# Patient Record
Sex: MALE | Race: WHITE | NOT HISPANIC OR LATINO | Employment: FULL TIME | URBAN - METROPOLITAN AREA
[De-identification: names, ages, dates, MRNs, and addresses within clinical notes are randomized per-mention and may not be internally consistent; named-entity substitution may affect disease eponyms.]

---

## 2017-06-10 ENCOUNTER — TRANSFERRED RECORDS (OUTPATIENT)
Dept: HEALTH INFORMATION MANAGEMENT | Facility: CLINIC | Age: 32
End: 2017-06-10

## 2017-06-14 ENCOUNTER — TRANSFERRED RECORDS (OUTPATIENT)
Dept: HEALTH INFORMATION MANAGEMENT | Facility: CLINIC | Age: 32
End: 2017-06-14

## 2017-06-15 ENCOUNTER — TRANSFERRED RECORDS (OUTPATIENT)
Dept: HEALTH INFORMATION MANAGEMENT | Facility: CLINIC | Age: 32
End: 2017-06-15

## 2017-12-25 ENCOUNTER — TRANSFERRED RECORDS (OUTPATIENT)
Dept: HEALTH INFORMATION MANAGEMENT | Facility: CLINIC | Age: 32
End: 2017-12-25

## 2018-01-08 ENCOUNTER — TRANSFERRED RECORDS (OUTPATIENT)
Dept: HEALTH INFORMATION MANAGEMENT | Facility: CLINIC | Age: 33
End: 2018-01-08

## 2018-01-13 ENCOUNTER — TRANSFERRED RECORDS (OUTPATIENT)
Dept: HEALTH INFORMATION MANAGEMENT | Facility: CLINIC | Age: 33
End: 2018-01-13

## 2022-02-13 ENCOUNTER — HOSPITAL ENCOUNTER (OUTPATIENT)
Facility: CLINIC | Age: 37
Setting detail: OBSERVATION
Discharge: HOME | End: 2022-02-15
Attending: EMERGENCY MEDICINE | Admitting: INTERNAL MEDICINE

## 2022-02-13 ENCOUNTER — APPOINTMENT (OUTPATIENT)
Dept: GENERAL RADIOLOGY | Facility: CLINIC | Age: 37
End: 2022-02-13
Attending: EMERGENCY MEDICINE
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
## 2022-02-13 DIAGNOSIS — L03.119 CELLULITIS OF HAND: ICD-10-CM

## 2022-02-13 LAB
ANION GAP SERPL CALCULATED.3IONS-SCNC: 6 MMOL/L (ref 3–14)
BASOPHILS # BLD AUTO: 0 10E3/UL (ref 0–0.2)
BASOPHILS NFR BLD AUTO: 0 %
BUN SERPL-MCNC: 12 MG/DL (ref 7–30)
CALCIUM SERPL-MCNC: 9.8 MG/DL (ref 8.5–10.1)
CHLORIDE BLD-SCNC: 106 MMOL/L (ref 94–109)
CO2 SERPL-SCNC: 27 MMOL/L (ref 20–32)
CREAT SERPL-MCNC: 0.78 MG/DL (ref 0.66–1.25)
EOSINOPHIL # BLD AUTO: 0.5 10E3/UL (ref 0–0.7)
EOSINOPHIL NFR BLD AUTO: 5 %
ERYTHROCYTE [DISTWIDTH] IN BLOOD BY AUTOMATED COUNT: 11.7 % (ref 10–15)
GFR SERPL CREATININE-BSD FRML MDRD: >90 ML/MIN/1.73M2
GLUCOSE BLD-MCNC: 84 MG/DL (ref 70–99)
HCT VFR BLD AUTO: 43.4 % (ref 40–53)
HGB BLD-MCNC: 14.4 G/DL (ref 13.3–17.7)
HOLD SPECIMEN: NORMAL
HOLD SPECIMEN: NORMAL
IMM GRANULOCYTES # BLD: 0 10E3/UL
IMM GRANULOCYTES NFR BLD: 0 %
LYMPHOCYTES # BLD AUTO: 1.5 10E3/UL (ref 0.8–5.3)
LYMPHOCYTES NFR BLD AUTO: 15 %
MCH RBC QN AUTO: 29.8 PG (ref 26.5–33)
MCHC RBC AUTO-ENTMCNC: 33.2 G/DL (ref 31.5–36.5)
MCV RBC AUTO: 90 FL (ref 78–100)
MONOCYTES # BLD AUTO: 0.9 10E3/UL (ref 0–1.3)
MONOCYTES NFR BLD AUTO: 9 %
NEUTROPHILS # BLD AUTO: 7 10E3/UL (ref 1.6–8.3)
NEUTROPHILS NFR BLD AUTO: 71 %
NRBC # BLD AUTO: 0 10E3/UL
NRBC BLD AUTO-RTO: 0 /100
PLATELET # BLD AUTO: 260 10E3/UL (ref 150–450)
POTASSIUM BLD-SCNC: 3.7 MMOL/L (ref 3.4–5.3)
RBC # BLD AUTO: 4.83 10E6/UL (ref 4.4–5.9)
SARS-COV-2 RNA RESP QL NAA+PROBE: NEGATIVE
SODIUM SERPL-SCNC: 139 MMOL/L (ref 133–144)
WBC # BLD AUTO: 9.9 10E3/UL (ref 4–11)

## 2022-02-13 PROCEDURE — 96375 TX/PRO/DX INJ NEW DRUG ADDON: CPT

## 2022-02-13 PROCEDURE — 36415 COLL VENOUS BLD VENIPUNCTURE: CPT | Performed by: EMERGENCY MEDICINE

## 2022-02-13 PROCEDURE — 99285 EMERGENCY DEPT VISIT HI MDM: CPT | Mod: 25

## 2022-02-13 PROCEDURE — 250N000011 HC RX IP 250 OP 636: Performed by: EMERGENCY MEDICINE

## 2022-02-13 PROCEDURE — 73130 X-RAY EXAM OF HAND: CPT | Mod: RT

## 2022-02-13 PROCEDURE — 87635 SARS-COV-2 COVID-19 AMP PRB: CPT | Performed by: EMERGENCY MEDICINE

## 2022-02-13 PROCEDURE — 85025 COMPLETE CBC W/AUTO DIFF WBC: CPT | Performed by: EMERGENCY MEDICINE

## 2022-02-13 PROCEDURE — 96365 THER/PROPH/DIAG IV INF INIT: CPT

## 2022-02-13 PROCEDURE — G0378 HOSPITAL OBSERVATION PER HR: HCPCS

## 2022-02-13 PROCEDURE — 250N000013 HC RX MED GY IP 250 OP 250 PS 637: Performed by: PHYSICIAN ASSISTANT

## 2022-02-13 PROCEDURE — 80048 BASIC METABOLIC PNL TOTAL CA: CPT | Performed by: EMERGENCY MEDICINE

## 2022-02-13 PROCEDURE — C9803 HOPD COVID-19 SPEC COLLECT: HCPCS

## 2022-02-13 PROCEDURE — 99220 PR INITIAL OBSERVATION CARE,LEVEL III: CPT | Performed by: PHYSICIAN ASSISTANT

## 2022-02-13 RX ORDER — IBUPROFEN 200 MG
400 TABLET ORAL EVERY 4 HOURS PRN
COMMUNITY
End: 2023-04-21

## 2022-02-13 RX ORDER — ONDANSETRON 4 MG/1
4 TABLET, ORALLY DISINTEGRATING ORAL EVERY 6 HOURS PRN
Status: DISCONTINUED | OUTPATIENT
Start: 2022-02-13 | End: 2022-02-15 | Stop reason: HOSPADM

## 2022-02-13 RX ORDER — LIDOCAINE 40 MG/G
CREAM TOPICAL
Status: DISCONTINUED | OUTPATIENT
Start: 2022-02-13 | End: 2022-02-15 | Stop reason: HOSPADM

## 2022-02-13 RX ORDER — ACETAMINOPHEN 325 MG/1
325-650 TABLET ORAL EVERY 4 HOURS PRN
COMMUNITY
End: 2023-04-21

## 2022-02-13 RX ORDER — AMPICILLIN AND SULBACTAM 2; 1 G/1; G/1
3 INJECTION, POWDER, FOR SOLUTION INTRAMUSCULAR; INTRAVENOUS EVERY 6 HOURS
Status: DISCONTINUED | OUTPATIENT
Start: 2022-02-13 | End: 2022-02-13

## 2022-02-13 RX ORDER — AMOXICILLIN 250 MG
1 CAPSULE ORAL 2 TIMES DAILY PRN
Status: DISCONTINUED | OUTPATIENT
Start: 2022-02-13 | End: 2022-02-15 | Stop reason: HOSPADM

## 2022-02-13 RX ORDER — ONDANSETRON 2 MG/ML
4 INJECTION INTRAMUSCULAR; INTRAVENOUS EVERY 6 HOURS PRN
Status: DISCONTINUED | OUTPATIENT
Start: 2022-02-13 | End: 2022-02-15 | Stop reason: HOSPADM

## 2022-02-13 RX ORDER — IBUPROFEN 600 MG/1
600 TABLET, FILM COATED ORAL EVERY 6 HOURS PRN
Status: DISCONTINUED | OUTPATIENT
Start: 2022-02-14 | End: 2022-02-15 | Stop reason: HOSPADM

## 2022-02-13 RX ORDER — AMOXICILLIN 250 MG
2 CAPSULE ORAL 2 TIMES DAILY PRN
Status: DISCONTINUED | OUTPATIENT
Start: 2022-02-13 | End: 2022-02-15 | Stop reason: HOSPADM

## 2022-02-13 RX ORDER — AMPICILLIN AND SULBACTAM 2; 1 G/1; G/1
3 INJECTION, POWDER, FOR SOLUTION INTRAMUSCULAR; INTRAVENOUS EVERY 6 HOURS
Status: DISCONTINUED | OUTPATIENT
Start: 2022-02-14 | End: 2022-02-15 | Stop reason: HOSPADM

## 2022-02-13 RX ORDER — KETOROLAC TROMETHAMINE 15 MG/ML
15 INJECTION, SOLUTION INTRAMUSCULAR; INTRAVENOUS ONCE
Status: COMPLETED | OUTPATIENT
Start: 2022-02-13 | End: 2022-02-13

## 2022-02-13 RX ORDER — ACETAMINOPHEN 325 MG/1
975 TABLET ORAL EVERY 8 HOURS
Status: DISCONTINUED | OUTPATIENT
Start: 2022-02-13 | End: 2022-02-15 | Stop reason: HOSPADM

## 2022-02-13 RX ADMIN — KETOROLAC TROMETHAMINE 15 MG: 15 INJECTION, SOLUTION INTRAMUSCULAR; INTRAVENOUS at 19:19

## 2022-02-13 RX ADMIN — AMPICILLIN SODIUM AND SULBACTAM SODIUM 3 G: 2; 1 INJECTION, POWDER, FOR SOLUTION INTRAMUSCULAR; INTRAVENOUS at 19:02

## 2022-02-13 RX ADMIN — ACETAMINOPHEN 975 MG: 325 TABLET, FILM COATED ORAL at 22:21

## 2022-02-13 ASSESSMENT — MIFFLIN-ST. JEOR: SCORE: 1910.58

## 2022-02-13 NOTE — ED TRIAGE NOTES
Pt was bit by a cat bite on Friday. Cat is a household pet. Pt received IV abx at [facility] and was started on oral antibiotics. Pt reports that pain and swelling is getting worse. ABC intact.

## 2022-02-14 LAB
ANION GAP SERPL CALCULATED.3IONS-SCNC: 4 MMOL/L (ref 3–14)
BUN SERPL-MCNC: 12 MG/DL (ref 7–30)
CALCIUM SERPL-MCNC: 8.9 MG/DL (ref 8.5–10.1)
CHLORIDE BLD-SCNC: 107 MMOL/L (ref 94–109)
CO2 SERPL-SCNC: 28 MMOL/L (ref 20–32)
CREAT SERPL-MCNC: 0.74 MG/DL (ref 0.66–1.25)
CRP SERPL-MCNC: 20.5 MG/L (ref 0–8)
ERYTHROCYTE [DISTWIDTH] IN BLOOD BY AUTOMATED COUNT: 11.9 % (ref 10–15)
GFR SERPL CREATININE-BSD FRML MDRD: >90 ML/MIN/1.73M2
GLUCOSE BLD-MCNC: 88 MG/DL (ref 70–99)
HCT VFR BLD AUTO: 43.9 % (ref 40–53)
HGB BLD-MCNC: 14.5 G/DL (ref 13.3–17.7)
MCH RBC QN AUTO: 29.7 PG (ref 26.5–33)
MCHC RBC AUTO-ENTMCNC: 33 G/DL (ref 31.5–36.5)
MCV RBC AUTO: 90 FL (ref 78–100)
PLATELET # BLD AUTO: 264 10E3/UL (ref 150–450)
POTASSIUM BLD-SCNC: 3.3 MMOL/L (ref 3.4–5.3)
RBC # BLD AUTO: 4.88 10E6/UL (ref 4.4–5.9)
SODIUM SERPL-SCNC: 139 MMOL/L (ref 133–144)
WBC # BLD AUTO: 9.2 10E3/UL (ref 4–11)

## 2022-02-14 PROCEDURE — 85027 COMPLETE CBC AUTOMATED: CPT | Performed by: PHYSICIAN ASSISTANT

## 2022-02-14 PROCEDURE — 36415 COLL VENOUS BLD VENIPUNCTURE: CPT | Performed by: PHYSICIAN ASSISTANT

## 2022-02-14 PROCEDURE — G0378 HOSPITAL OBSERVATION PER HR: HCPCS

## 2022-02-14 PROCEDURE — 86140 C-REACTIVE PROTEIN: CPT | Performed by: PHYSICIAN ASSISTANT

## 2022-02-14 PROCEDURE — 99207 PR NON-BILLABLE SERV PER CHARTING: CPT | Performed by: PHYSICIAN ASSISTANT

## 2022-02-14 PROCEDURE — 96376 TX/PRO/DX INJ SAME DRUG ADON: CPT

## 2022-02-14 PROCEDURE — 82310 ASSAY OF CALCIUM: CPT | Performed by: PHYSICIAN ASSISTANT

## 2022-02-14 PROCEDURE — 250N000013 HC RX MED GY IP 250 OP 250 PS 637: Performed by: PHYSICIAN ASSISTANT

## 2022-02-14 PROCEDURE — 250N000011 HC RX IP 250 OP 636: Performed by: PHYSICIAN ASSISTANT

## 2022-02-14 RX ORDER — NALOXONE HYDROCHLORIDE 0.4 MG/ML
0.4 INJECTION, SOLUTION INTRAMUSCULAR; INTRAVENOUS; SUBCUTANEOUS
Status: DISCONTINUED | OUTPATIENT
Start: 2022-02-14 | End: 2022-02-15 | Stop reason: HOSPADM

## 2022-02-14 RX ORDER — NALOXONE HYDROCHLORIDE 0.4 MG/ML
0.2 INJECTION, SOLUTION INTRAMUSCULAR; INTRAVENOUS; SUBCUTANEOUS
Status: DISCONTINUED | OUTPATIENT
Start: 2022-02-14 | End: 2022-02-15 | Stop reason: HOSPADM

## 2022-02-14 RX ORDER — KETOROLAC TROMETHAMINE 15 MG/ML
15 INJECTION, SOLUTION INTRAMUSCULAR; INTRAVENOUS EVERY 6 HOURS PRN
Status: DISCONTINUED | OUTPATIENT
Start: 2022-02-14 | End: 2022-02-15 | Stop reason: HOSPADM

## 2022-02-14 RX ORDER — OXYCODONE HYDROCHLORIDE 5 MG/1
5 TABLET ORAL EVERY 4 HOURS PRN
Status: DISCONTINUED | OUTPATIENT
Start: 2022-02-14 | End: 2022-02-15 | Stop reason: HOSPADM

## 2022-02-14 RX ADMIN — ACETAMINOPHEN 975 MG: 325 TABLET, FILM COATED ORAL at 22:05

## 2022-02-14 RX ADMIN — IBUPROFEN 600 MG: 600 TABLET ORAL at 01:19

## 2022-02-14 RX ADMIN — AMPICILLIN SODIUM AND SULBACTAM SODIUM 3 G: 2; 1 INJECTION, POWDER, FOR SOLUTION INTRAMUSCULAR; INTRAVENOUS at 20:39

## 2022-02-14 RX ADMIN — IBUPROFEN 600 MG: 600 TABLET ORAL at 17:28

## 2022-02-14 RX ADMIN — ACETAMINOPHEN 975 MG: 325 TABLET, FILM COATED ORAL at 13:53

## 2022-02-14 RX ADMIN — AMPICILLIN SODIUM AND SULBACTAM SODIUM 3 G: 2; 1 INJECTION, POWDER, FOR SOLUTION INTRAMUSCULAR; INTRAVENOUS at 01:20

## 2022-02-14 RX ADMIN — AMPICILLIN SODIUM AND SULBACTAM SODIUM 3 G: 2; 1 INJECTION, POWDER, FOR SOLUTION INTRAMUSCULAR; INTRAVENOUS at 13:53

## 2022-02-14 RX ADMIN — ACETAMINOPHEN 975 MG: 325 TABLET, FILM COATED ORAL at 06:18

## 2022-02-14 RX ADMIN — AMPICILLIN SODIUM AND SULBACTAM SODIUM 3 G: 2; 1 INJECTION, POWDER, FOR SOLUTION INTRAMUSCULAR; INTRAVENOUS at 06:18

## 2022-02-14 RX ADMIN — IBUPROFEN 600 MG: 600 TABLET ORAL at 10:32

## 2022-02-14 NOTE — H&P
History and Physical     Brent Brock MRN# [id number]   YOB: 1985 Age: 37 year old      Date of Admission:  2/13/2022    Primary care provider: No Ref-Primary, Physician          Assessment and Plan:   Brent Brock is a 37 year old male with a PMH significant for diverticulitis with partial colon removal and abscess drainage who presents with right hand swelling, redness and pain after being bitten multiple times by his mother-in-law's vaccinated cat.    Patient was discussed with Dr. Lr, who was provider in ED. Chart review of ED work up was reviewed as well as chart review of Care Everywhere, previous visits and admissions.     #Right hand cat bite cellulitis  #Right hand swelling, erythema and pain  -Multiple cat bites by resident vaccinated on dorsal part of right hand on 2/11  -Evaluated in urgent care on 2/12 with tetanus vaccination, IV clindamycin and initiation of Augmentin  -Took Augmentin on 2/12 and this morning but swelling and pain has worsened  -He is afebrile with normal WBC and hand x-ray did not show evidence of fracture or free air  -He was started on IV Unasyn and given Toradol  -Dorsal portion of hand and fingers appears swollen but there is no deep erythema or spreading cellulitis, there is a light generalized redness of the dorsal portion of the hand  -Unclear if this will need surgical washout but I doubt it  -Elevate arm  -Ice right hand  -Continue Unasyn  -We will alternate Tylenol with ibuprofen and have oxycodone/Dilaudid available  -Will be n.p.o. after midnight in case surgical washout is needed  -Orthopedic consult      Social: No concerns  Code: Discussed with patient and they have chosen full code  VTE prophylaxis: PCDs  Disposition: Observation                    Chief Complaint:   Right hand swelling, redness and pain after multiple cat bites         History of Present Illness:   Brent Brock is a 37 year old male who presents with  multiple cat bite wounds on his right hand.  He reports his cat and his mother-in-law's cat got into a fight on 2/11.  He broke up the fight but was bitten multiple times by his mother-in-law's cat.  He believes the cat is vaccinated and has not been exhibiting any odd behavior.  He went to urgent care and received tetanus vaccine, IV clindamycin and a prescription for Augmentin.  He took 2 doses on 2/12 and 1 dose this morning but has had increased swelling and pain of the right hand.  He also notes that his wife gave birth to their first child on 2/11 and he is not only sleep exhausted but has been assisting with the care of the baby.  He has been taking ibuprofen and Tylenol for pain.  He has not been ill recently and denies fever, chills, nausea, vomiting, diarrhea and abdominal pain.  He does not drink alcohol daily or smoke cigarettes bud does chew tobacco.             Past Medical History:   Diverticulitis          Past Surgical History:     Past Surgical History:   Procedure Laterality Date     BIOPSY OF EYELID      Benign growth     Diverticular abscess drainage and partial colon removal          Social History:     Social History     Socioeconomic History     Marital status:      Spouse name: Not on file     Number of children: Not on file     Years of education: Not on file     Highest education level: Not on file   Occupational History     Not on file   Tobacco Use     Smoking status: Current Every Day Smoker     Smokeless tobacco: Not on file     Tobacco comment: 1 ppd   Substance and Sexual Activity     Alcohol use: No     Drug use: No     Sexual activity: Yes     Partners: Female     Birth control/protection: Condom   Other Topics Concern     Not on file   Social History Narrative     Not on file     Social Determinants of Health     Financial Resource Strain: Not on file   Food Insecurity: Not on file   Transportation Needs: Not on file   Physical Activity: Not on file   Stress: Not on file    Social Connections: Not on file   Intimate Partner Violence: Not on file   Housing Stability: Not on file               Family History:     Family History   Problem Relation Age of Onset     Hypertension Mother      Depression Sister      Diabetes Mother         borderline              Allergies:      Allergies   Allergen Reactions     No Known Drug Allergies                Medications:     Prior to Admission medications    Medication Sig Last Dose Taking? Auth Provider   acetaminophen (TYLENOL) 325 MG tablet Take 325-650 mg by mouth every 4 hours as needed for mild pain 2/13/2022 at 1300 Yes Unknown, Entered By History   amoxicillin-clavulanate (AUGMENTIN) 875-125 MG tablet Take 1 tablet by mouth 2 times daily For 10 days 2/13/2022 at 1100 Yes Unknown, Entered By History   ibuprofen (ADVIL/MOTRIN) 200 MG tablet Take 400 mg by mouth every 4 hours as needed for mild pain or inflammatory pain 2/13/2022 at 0900 Yes Unknown, Entered By History              Review of Systems:   A Comprehensive greater than 10 system review of systems was carried out.  Pertinent positives and negatives are noted above.  Otherwise negative for contributory information.            Physical Exam:   Blood pressure (!) 153/93, pulse 94, temperature 98.5  F (36.9  C), temperature source Oral, resp. rate 16, SpO2 100 %.  Exam:  GENERAL:  Comfortable.  PSYCH: pleasant, oriented, No acute distress.  HEENT:  PERRLA. Normal conjunctiva, normal hearing, nasal mucosa and Oropharynx are normal.  NECK:  Supple, no neck vein distention, adenopathy or bruits, normal thyroid.  HEART:  Normal S1, S2 with no murmur, no pericardial rub, gallops or S3 or S4.  LUNGS:  Clear to auscultation, normal Respiratory effort. No wheezing, rales or ronchi.  ABDOMEN:  Soft, no hepatosplenomegaly, normal bowel sounds. Non-tender, non distended.   EXTREMITIES:  No pedal edema, +2 pulses bilateral and equal.  Right hand is diffusely swollen with some slight erythrema on  the dorsal surface extending into the fingers partially.  There are multiple cat bites on his hand that are not surrounded by any dark erythema.  He is unable to bend any of his fingers very much due to swelling and most of the pain is centered in his middle finger.  SKIN:  Dry to touch, No rash, wound or ulcerations.  NEUROLOGIC:  CN 2-12 grossly intact,sensation is intact with no focal deficits.               Data:     Recent Labs   Lab 02/13/22  1820   WBC 9.9   HGB 14.4   HCT 43.4   MCV 90        Recent Labs   Lab 02/13/22  1820      POTASSIUM 3.7   CHLORIDE 106   CO2 27   ANIONGAP 6   GLC 84   BUN 12   CR 0.78   GFRESTIMATED >90   AILYN 9.8         Recent Results (from the past 24 hour(s))   XR Hand Right G/E 3 Views    Narrative    EXAM: XR HAND RT G/E 3 VW  LOCATION: [facility]  DATE/TIME: 2/13/2022 7:10 PM    INDICATION: pain swelling after cat bite  COMPARISON: None.      Impression    IMPRESSION: Soft tissue swelling the dorsum of the metacarpal region. No cortical erosion to suggest osteomyelitis. No fractures are evident. Normal joint spacing and alignment.         Mayi Linn PA-C

## 2022-02-14 NOTE — PLAN OF CARE
PRIMARY DIAGNOSIS: R. Hand cellulitis  OUTPATIENT/OBSERVATION GOALS TO BE MET BEFORE DISCHARGE:  1. Vitals sign stable or return to baseline: Yes    2. Tolerating oral antibiotics or has home infusion set up if applicable: TBD. On Unasyn q6    3. Pain status: Improved-controlled with oral pain medications.    4. Return to near baseline physical activity: Yes    Discharge Planner Nurse   Safe discharge environment identified: Yes  Barriers to discharge: Yes       Entered by: Juanjo Fuentes 02/14/2022 4:14 AM    Vitals are Temp: 98.1  F (36.7  C) Temp src: Oral BP: 131/82 Pulse: 84   Resp: 16 SpO2: 97 %.  Patient is Alert and Oriented x4. Afebrile. Independent in room. NPO except for ice. Pt taking tylenol and ibuprofen for right hand pain. PIV SL in between antibiotics. Trace edema and erythema to right hand, outlined. Scab to puncture site. Pillows in use to elevate arm. Intermittent numbness reported. Ortho to see.       Please review provider order for any additional goals.     Nurse to notify provider when observation goals have been met and patient is ready for discharge.

## 2022-02-14 NOTE — ED NOTES
[facility]  ED Nurse Handoff Report    Brent Brock is a 37 year old male   ED Chief complaint: No chief complaint on file.  . ED Diagnosis:   Final diagnoses:   None     Allergies:   Allergies   Allergen Reactions     No Known Drug Allergies        Code Status: Full Code  Activity level - Baseline/Home:  Independent. Activity Level - Current:   Independent. Lift room needed: No. Bariatric: No   Needed: No   Isolation: No. Infection: Not Applicable.     Vital Signs:   Vitals:    02/13/22 1728   BP: (!) 153/93   Pulse: 94   Resp: 16   Temp: 98.5  F (36.9  C)   TempSrc: Oral   SpO2: 100%       Cardiac Rhythm:  ,      Pain level:    Patient confused: No. Patient Falls Risk: No.   Elimination Status: Has voided   Patient Report - Initial Complaint: Cat Bite. Focused Assessment: Brent Brock is a 37 year old male who presents with concern for right hand redness and swelling and pain after cat bite 2 days ago.  Patient reports breaking up a fight between a few cats at his residence when he was bit in the hand.  He reports that the cats vaccines are up-to-date.  He presented to urgent care and received tetanus, dose of IV clindamycin, prescription for Augmentin.  He took 2 doses of the Augmentin thus far and has noticed progressive swelling and redness beyond the borders demarcated on his hand at urgent care.  He denies fever.  He has been taking ibuprofen and Tylenol for his pain with some relief.  No numbness in the affected hand.  Pain is worse when he attempts to make a fist with the hand.   Tests Performed: XR, labs. Abnormal Results:   Labs Ordered and Resulted from Time of ED Arrival to Time of ED Departure   BASIC METABOLIC PANEL - Normal       Result Value    Sodium 139      Potassium 3.7      Chloride 106      Carbon Dioxide (CO2) 27      Anion Gap 6      Urea Nitrogen 12      Creatinine 0.78      Calcium 9.8      Glucose 84      GFR Estimate >90     CBC WITH PLATELETS AND  DIFFERENTIAL    WBC Count 9.9      RBC Count 4.83      Hemoglobin 14.4      Hematocrit 43.4      MCV 90      MCH 29.8      MCHC 33.2      RDW 11.7      Platelet Count 260      % Neutrophils 71      % Lymphocytes 15      % Monocytes 9      % Eosinophils 5      % Basophils 0      % Immature Granulocytes 0      NRBCs per 100 WBC 0      Absolute Neutrophils 7.0      Absolute Lymphocytes 1.5      Absolute Monocytes 0.9      Absolute Eosinophils 0.5      Absolute Basophils 0.0      Absolute Immature Granulocytes 0.0      Absolute NRBCs 0.0     COVID-19 VIRUS (CORONAVIRUS) BY PCR     .   Treatments provided: IV abx, see MAR  Family Comments: no one at bedside  OBS brochure/video discussed/provided to patient:  Yes  ED Medications:   Medications   ampicillin-sulbactam (UNASYN) 3 g vial to attach to  mL bag (has no administration in time range)     Drips infusing:  No  For the majority of the shift, the patient's behavior Green. Interventions performed were NA.    Sepsis treatment initiated: No     Patient tested for COVID 19 prior to admission: YES    ED Nurse Name/Phone Number: Suzanne Daniel RN,   6:56 PM    RECEIVING UNIT ED HANDOFF REVIEW    Above ED Nurse Handoff Report was reviewed: Yes  Reviewed by: Juanjo Fuentes on February 13, 2022 at 9:41 PM

## 2022-02-14 NOTE — PHARMACY-ADMISSION MEDICATION HISTORY
Admission medication history interview status for this patient is complete. See [facility] admission navigator for allergy information, prior to admission medications and immunization status.     Medication history interview done, indicate source(s): Patient  Medication history resources (including written lists, pill bottles, clinic record): Patient, Darvin Bishop  [facility]    Changes made to PTA medication list:  Added: all medications  Changed:   Reported as Not Taking:   Removed: adderall, strattera, imitrex, fioricet    Actions taken by pharmacist (provider contacted, etc): left sticky to MD     Additional medication history information: amoxicillin/clavulanate 10 day regimen was started yesterday morning    Medication reconciliation/reorder completed by provider prior to medication history?  N      Prior to Admission medications    Medication Sig Last Dose Taking? Auth Provider   acetaminophen (TYLENOL) 325 MG tablet Take 325-650 mg by mouth every 4 hours as needed for mild pain 2/13/2022 at 1300 Yes Unknown, Entered By History   amoxicillin-clavulanate (AUGMENTIN) 875-125 MG tablet Take 1 tablet by mouth 2 times daily For 10 days 2/13/2022 at 1100 Yes Unknown, Entered By History   ibuprofen (ADVIL/MOTRIN) 200 MG tablet Take 400 mg by mouth every 4 hours as needed for mild pain or inflammatory pain 2/13/2022 at 0900 Yes Unknown, Entered By History

## 2022-02-14 NOTE — PLAN OF CARE
PRIMARY DIAGNOSIS: SOFT TISSUE INFECTIONS  OUTPATIENT/OBSERVATION GOALS TO BE MET BEFORE DISCHARGE:  Vitals sign stable or return to baseline: Yes    Tolerating oral antibiotics or has home infusion set up if applicable:  continues on iv abx at this time but is tolerating oral intake     Pain status: controlled w/ tylenol, ibuprofen, ice, elevation    Return to near baseline physical activity: Yes    Discharge Planner Nurse   Safe discharge environment identified: Yes  Barriers to discharge: Not once medically cleared       Entered by: Rochelle Sorto 02/14/2022 4:07 PM     Please review provider order for any additional goals.     Nurse to notify provider when observation goals have been met and patient is ready for discharge.

## 2022-02-14 NOTE — ED PROVIDER NOTES
History     Chief Complaint:    No chief complaint on file.       HPI   Brent Brock is a 37 year old male who presents with concern for right hand redness and swelling and pain after cat bite 2 days ago.  Patient reports breaking up a fight between a few cats at his residence when he was bit in the hand.  He reports that the cats vaccines are up-to-date.  He presented to urgent care and received tetanus, dose of IV clindamycin, prescription for Augmentin.  He took 2 doses of the Augmentin thus far and has noticed progressive swelling and redness beyond the borders demarcated on his hand at urgent care.  He denies fever.  He has been taking ibuprofen and Tylenol for his pain with some relief.  No numbness in the affected hand.  Pain is worse when he attempts to make a fist with the hand.    Allergies:  No Known Drug Allergies     Medications:    acetaminophen (TYLENOL) 325 MG tablet  amoxicillin-clavulanate (AUGMENTIN) 875-125 MG tablet  ibuprofen (ADVIL/MOTRIN) 200 MG tablet        Past Medical History:    No past medical history on file.    Patient Active Problem List    Diagnosis Date Noted     Cellulitis of hand 02/13/2022     Priority: Medium     Cat bite, initial encounter 02/13/2022     Priority: Medium     CARDIOVASCULAR SCREENING; LDL GOAL LESS THAN 160 10/31/2010     Priority: Medium     Attention deficit hyperactivity disorder (ADHD) 12/14/2004     Priority: Medium     Problem list name updated by automated process. Provider to review       Migraine 12/14/2004     Priority: Medium     Problem list name updated by automated process. Provider to review          Past Surgical History:    Past Surgical History:   Procedure Laterality Date     BIOPSY OF EYELID      Benign growth        Family History:    family history includes Depression in his sister; Diabetes in his mother; Hypertension in his mother.    Social History:   reports that he has been smoking. He does not have any smokeless tobacco  "history on file. He reports that he does not drink alcohol and does not use drugs.    PCP: No primary care provider on file.     Review of Systems  A 10 point ROS was obtained and negative except as noted here and in HPI      Physical Exam     Patient Vitals for the past 24 hrs:   BP Temp Temp src Pulse Resp SpO2   02/13/22 2132 -- -- -- -- -- 95 %   02/13/22 2131 (!) 129/93 -- -- -- 16 93 %   02/13/22 2115 -- -- -- -- -- 99 %   02/13/22 2100 -- -- -- 87 -- --   02/13/22 2015 (!) 142/98 -- -- 79 -- 99 %   02/13/22 2000 (!) 154/102 -- -- 79 -- 99 %   02/13/22 1728 (!) 153/93 98.5  F (36.9  C) Oral 94 16 100 %        Physical Exam  VS: Reviewed per above  HENT: normal speech  EYES: sclera anicteric  CV: Rate as noted, regular rhythm.   RESP: Effort normal. Breath sounds are normal bilaterally.  GI: no tenderness/rebound/guarding, not distended.  NEURO: Alert, moving all extremities  MSK: No deformity of the extremities.  Swelling of the right dorsal hand with associated redness and warmth.  There are scattered cat bite marks of the dorsal right hand as well.  No tenderness along the flexor tendons of the right hand digits or \"sausage digits\" or pain with passive extension of the right hand digits.  He does have pain with flexion of the right middle and ring finger.  SKIN: Warm and dry    Emergency Department Course       Imaging:    XR Hand Right G/E 3 Views   Final Result   IMPRESSION: Soft tissue swelling the dorsum of the metacarpal region. No cortical erosion to suggest osteomyelitis. No fractures are evident. Normal joint spacing and alignment.           Laboratory:    Labs Ordered and Resulted from Time of ED Arrival to Time of ED Departure   BASIC METABOLIC PANEL - Normal       Result Value    Sodium 139      Potassium 3.7      Chloride 106      Carbon Dioxide (CO2) 27      Anion Gap 6      Urea Nitrogen 12      Creatinine 0.78      Calcium 9.8      Glucose 84      GFR Estimate >90     COVID-19 VIRUS " (CORONAVIRUS) BY PCR - Normal    SARS CoV2 PCR Negative     CBC WITH PLATELETS AND DIFFERENTIAL    WBC Count 9.9      RBC Count 4.83      Hemoglobin 14.4      Hematocrit 43.4      MCV 90      MCH 29.8      MCHC 33.2      RDW 11.7      Platelet Count 260      % Neutrophils 71      % Lymphocytes 15      % Monocytes 9      % Eosinophils 5      % Basophils 0      % Immature Granulocytes 0      NRBCs per 100 WBC 0      Absolute Neutrophils 7.0      Absolute Lymphocytes 1.5      Absolute Monocytes 0.9      Absolute Eosinophils 0.5      Absolute Basophils 0.0      Absolute Immature Granulocytes 0.0      Absolute NRBCs 0.0          Interventions:    Medications   ampicillin-sulbactam (UNASYN) 3 g vial to attach to  mL bag (0 g Intravenous Stopped 2/13/22 1919)   ketorolac (TORADOL) injection 15 mg (15 mg Intravenous Given 2/13/22 1919)        Emergency Department Course:  Past medical records, nursing notes, and vitals reviewed.  I performed an exam of the patient and obtained history, as documented above.  I rechecked the patient. Findings and plan explained to the Patient. Patient was admitted.    Impression & Plan      Medical Decision Making:  Patient presents to the ER for evaluation of increasing pain and swelling and redness to the right hand 2 days after cat bite to the right dorsal hand.  On arrival vital signs are reassuring.  On exam he has swelling and redness and tenderness of the affected area.  I do not see signs of flexor tenosynovitis although he does have discomfort involving the extensor tendons.  At this time lower suspicion for deep space abscess in the hand.  X-ray does not show fracture dislocation or sign suggestive of necrotizing soft tissue infection.  He was started on Unasyn and admitted for further treatment.    Diagnosis:    ICD-10-CM    1. Cat bite, initial encounter  W55.01XA    2. Cellulitis of hand  L03.119         Discharge Medications:  New Prescriptions    No medications on file         2/13/2022   Elmer Lr MD Lindenbaum, Elan, MD  02/13/22 8513

## 2022-02-14 NOTE — PROGRESS NOTES
"[facility]  Hospitalist Progress Note    Assessment & Plan   Brent Brock is a 37 year old right hand dominant male with a PMH significant for diverticulitis with partial colon removal and abscess drainage who was admitted on 2/13/2022 with cellulitis secondary to cat bite.   Injury sustained \"breaking up fight between cats at his residence, bit in right hand 2/11\". Puncture site to dorsum of right hand.  Initially evaluated in urgent care on February 12 where tetanus vaccination was updated he was given IV clindamycin and started on a course of Augmentin. Presented given increasing erythema, swelling, pain in right hand.     On presentation to the ED, afebrile. Lab work unremarkable. No evidence of sepsis. Imaging of right hand not show fracture dislocation or sign suggestive of necrotizing soft tissue infection.  He was started on Unasyn and admitted to observation for further treatment.     Non purulent cellulitis secondary to Cat bite to right hand - symptoms improving.  No current evidence of deeper space infection. No palmar pain. No lymphangitis. Neurovascular status intact.   -Ortho following: no current plans for I&D  -analgesia PRN  -elevate, ice PRN  -If improving, home tomorrow on PO abx with ortho follow up     DVT Prophylaxis: Pneumatic Compression Devices  Code Status: Full Code  Expected Discharge/location: Home ->  02/15/2022   on p.o. antibiotics if no washout.  Patient has a new born and hopeful to return home as soon as he can.    Kimberli Quach PA-C      Interval History   Assumed care  No fever  Erythema and pain improving.  He has discomfort with extension of his right hand  Able to make a fist  Pain is a reported to be a 2 out of 10 compared to 5 out of 10 on admission.    -Data reviewed today: I reviewed all new labs and imaging results over the last 24 hours.    Physical Exam   Temp: 97.7  F (36.5  C) Temp src: Oral BP: 115/75 Pulse: 68   Resp: 20 SpO2: 97 % " O2 Device: None (Room air)    Vitals:    02/13/22 2158   Weight: 99 kg (218 lb 4.8 oz)     Vital Signs with Ranges  Temp:  [97.7  F (36.5  C)-98.5  F (36.9  C)] 97.7  F (36.5  C)  Pulse:  [68-94] 68  Resp:  [16-20] 20  BP: (115-154)/() 115/75  SpO2:  [93 %-100 %] 97 %  No intake/output data recorded.      Constitutional:Awake, alert, no apparent distress  Respiratory:  Normal work of breathing.   Cardiovascular: Regular rate and rhythm, normal S1 and S2, and no murmur appreciated.   GI: Normal bowel sounds, soft, non-distended, non-tender.   Skin/Integument: No rashes, no cyanosis, no peripheral edema.  MK: puncture sight to dorsum of right hand at metatarsal region. No purrulent discharge. No expanding erythema or streaking. Pain with extension of fingers, not flexion. No pain in palmar aspect. Radial pulses symmetrical.   Neuro: Moving all extremities with normal strength. Coordination and sensation grossly intact. Speech clear. No focal deficits.   Psych: Appropriate affect.      Medications       acetaminophen  975 mg Oral Q8H     ampicillin-sulbactam (UNASYN) IV  3 g Intravenous Q6H     sodium chloride (PF)  3 mL Intracatheter Q8H       Data   Recent Labs   Lab 02/14/22  0534 02/13/22  1820   WBC 9.2 9.9   HGB 14.5 14.4   MCV 90 90    260    139   POTASSIUM 3.3* 3.7   CHLORIDE 107 106   CO2 28 27   BUN 12 12   CR 0.74 0.78   ANIONGAP 4 6   AILYN 8.9 9.8   GLC 88 84       Recent Results (from the past 24 hour(s))   XR Hand Right G/E 3 Views    Narrative    EXAM: XR HAND RT G/E 3 VW  LOCATION: [facility]  DATE/TIME: 2/13/2022 7:10 PM    INDICATION: pain swelling after cat bite  COMPARISON: None.      Impression    IMPRESSION: Soft tissue swelling the dorsum of the metacarpal region. No cortical erosion to suggest osteomyelitis. No fractures are evident. Normal joint spacing and alignment.

## 2022-02-14 NOTE — PLAN OF CARE
PRIMARY DIAGNOSIS: R. Hand cellulitis  OUTPATIENT/OBSERVATION GOALS TO BE MET BEFORE DISCHARGE:  Vitals sign stable or return to baseline: Yes    Tolerating oral antibiotics or has home infusion set up if applicable: TBD. On Unasyn q6    Pain status: Improved-controlled with oral pain medications.    Return to near baseline physical activity: Yes    Discharge Planner Nurse   Safe discharge environment identified: Yes  Barriers to discharge: Yes       Entered by: Juanjo Fuentes 02/14/2022 2:52 AM    Vitals are Temp: 98.1  F (36.7  C) Temp src: Oral BP: 131/82 Pulse: 84   Resp: 16 SpO2: 97 %.  Patient is Alert and Oriented x4. Afebrile. Independent in room. NPO except for ice. Pt taking tylenol and ibuprofen for right hand pain. PIV SL in between antibiotics. Trace edema and erythema to right hand, outlined. Scab to puncture site. Pillows in use to elevate arm. Intermittent numbness reported. Ortho to see.     Please review provider order for any additional goals.     Nurse to notify provider when observation goals have been met and patient is ready for discharge.

## 2022-02-14 NOTE — PLAN OF CARE
PRIMARY DIAGNOSIS: Right Hand Cellulitis  OUTPATIENT/OBSERVATION GOALS TO BE MET BEFORE DISCHARGE:  Vitals sign stable or return to baseline: Yes    Tolerating oral antibiotics or has home infusion set up if applicable: Yes    Pain status: Improved-controlled with oral pain medications.  Patient using Tylenol and Ibuprofen for discomfort.    Return to near baseline physical activity: Yes    Discharge Planner Nurse   Safe discharge environment identified: Yes  Barriers to discharge: Not once medically cleared       Entered by: Rochelle Sorto 02/14/2022 7:55 AM     Please review provider order for any additional goals.     Nurse to notify provider when observation goals have been met and patient is ready for discharge.

## 2022-02-14 NOTE — PLAN OF CARE
ROOM # 204-2     Living Situation (if not independent, order SW consult): home w/wife  Facility name:  : [facility]    Activity level at baseline: Ind   Activity level on admit: Ind    Who will be transporting you at discharge: wife or mother-in-law    Patient registered to observation; given Patient Bill of Rights; given the opportunity to ask questions about observation status and their plan of care.  Patient has been oriented to the observation room, bathroom and call light is in place.    Discussed discharge goals and expectations with patient/family.

## 2022-02-14 NOTE — CONSULTS
"[facility]    Orthopedic Consultation    Brent Brock MRN# [id number]   Age: 37 year old YOB: 1985     Date of Admission:  2022    Reason for consult: Right hand cat bite       Requesting physician: Mayi ZULETA       Level of consult: Consult, follow and place orders           Assessment and Plan:   Assessment:   Right dorsal hand cat bite  Right hand cellulitis      Plan:   Conservative treatment at this time  Continue IV abx  Elevate right hand on pillows or above head as much as able  NPO after midnight for recheck in AM  May have diet today           Chief Complaint:   Right hand cat bite         History of Present Illness:   This patient is a 37 year old male who presents with the following condition requiring a hospital admission:    Per Hospitalist note: admitted on 2022 with cellulitis secondary to cat bite.   Injury sustained \"breaking up fight between cats at his residence, bit in right hand \". Puncture site to dorsum of right hand.  Initially evaluated in urgent care on  where tetanus vaccination was updated he was given IV clindamycin and started on a course of Augmentin. Presented given increasing erythema, swelling, pain in right hand.      On presentation to the ED, afebrile. Lab work unremarkable. No evidence of sepsis. Imaging of right hand not show fracture dislocation or sign suggestive of necrotizing soft tissue infection.  He was started on Unasyn and admitted to observation for further treatment.   Patient has  baby at home, 6 days old, and would like to return home ASAP.           Past Medical History:   No past medical history on file.          Past Surgical History:     Past Surgical History:   Procedure Laterality Date     BIOPSY OF EYELID      Benign growth             Social History:     Social History     Tobacco Use     Smoking status: Current Every Day Smoker     Smokeless tobacco: Not on file     " Tobacco comment: 1 ppd   Substance Use Topics     Alcohol use: No             Family History:     Family History   Problem Relation Age of Onset     Hypertension Mother      Depression Sister      Diabetes Mother         borderline             Immunizations:     VACCINE/DOSE   Diptheria   DPT   DTAP   HBIG   Hepatitis A   Hepatitis B   HIB   Influenza   Measles   Meningococcal   MMR   Mumps   Pneumococcal   Polio   Rubella   Small Pox   TDAP   Varicella   Zoster             Allergies:     Allergies   Allergen Reactions     No Known Drug Allergies              Medications:     Current Facility-Administered Medications   Medication     acetaminophen (TYLENOL) tablet 975 mg     ampicillin-sulbactam (UNASYN) 3 g vial to attach to  mL bag     ibuprofen (ADVIL/MOTRIN) tablet 600 mg     ketorolac (TORADOL) injection 15 mg     lidocaine (LMX4) cream     lidocaine 1 % 0.1-1 mL     melatonin tablet 1 mg     naloxone (NARCAN) injection 0.2 mg    Or     naloxone (NARCAN) injection 0.4 mg    Or     naloxone (NARCAN) injection 0.2 mg    Or     naloxone (NARCAN) injection 0.4 mg     ondansetron (ZOFRAN-ODT) ODT tab 4 mg    Or     ondansetron (ZOFRAN) injection 4 mg     oxyCODONE (ROXICODONE) tablet 5 mg     senna-docusate (SENOKOT-S/PERICOLACE) 8.6-50 MG per tablet 1 tablet    Or     senna-docusate (SENOKOT-S/PERICOLACE) 8.6-50 MG per tablet 2 tablet     sodium chloride (PF) 0.9% PF flush 3 mL     sodium chloride (PF) 0.9% PF flush 3 mL             Review of Systems:   CV: NEGATIVE for chest pain, palpitations or peripheral edema  C: NEGATIVE for fever, chills, change in weight  E/M: NEGATIVE for ear, mouth and throat problems  R: NEGATIVE for significant cough or SOB          Physical Exam:   All vitals have been reviewed  Patient Vitals for the past 24 hrs:   BP Temp Temp src Pulse Resp SpO2 Height Weight   02/14/22 0748 115/75 97.7  F (36.5  C) Oral 68 20 97 % -- --   02/14/22 0117 131/82 98.1  F (36.7  C) Oral 84 16 97 %  "-- --   02/13/22 2158 (!) 141/82 98.4  F (36.9  C) Oral 83 18 99 % 1.753 m (5' 9\") 99 kg (218 lb 4.8 oz)   02/13/22 2132 -- -- -- -- -- 95 % -- --   02/13/22 2131 (!) 129/93 -- -- -- 16 93 % -- --   02/13/22 2115 -- -- -- -- -- 99 % -- --   02/13/22 2100 -- -- -- 87 -- -- -- --   02/13/22 2015 (!) 142/98 -- -- 79 -- 99 % -- --   02/13/22 2000 (!) 154/102 -- -- 79 -- 99 % -- --   02/13/22 1728 (!) 153/93 98.5  F (36.9  C) Oral 94 16 100 % -- --     No intake or output data in the 24 hours ending 02/14/22 1056  Patient laying comfortably in bed  Right hand with mild swelling to dorsum of hand  Unable to full extend long finger, flexion restricted to about 45 degrees  Pain with ROM and light touch to long finger MCP  No pain on palmar aspect of the hand including the right long finger  Minimal erythema  Fresh tattoo with skin peeling on dorsum of left wrist and distal hand  ROM of wrist intact  Passive ROM of PIP and DIP long finger without pain  Brisk cap refill  + radial pulse  Sensation grossly intact to light touch          Data:   All laboratory data reviewed  Results for orders placed or performed during the hospital encounter of 02/13/22   XR Hand Right G/E 3 Views     Status: None    Narrative    EXAM: XR HAND RT G/E 3 VW  LOCATION: [facility]  DATE/TIME: 2/13/2022 7:10 PM    INDICATION: pain swelling after cat bite  COMPARISON: None.      Impression    IMPRESSION: Soft tissue swelling the dorsum of the metacarpal region. No cortical erosion to suggest osteomyelitis. No fractures are evident. Normal joint spacing and alignment.   Basic metabolic panel     Status: Normal   Result Value Ref Range    Sodium 139 133 - 144 mmol/L    Potassium 3.7 3.4 - 5.3 mmol/L    Chloride 106 94 - 109 mmol/L    Carbon Dioxide (CO2) 27 20 - 32 mmol/L    Anion Gap 6 3 - 14 mmol/L    Urea Nitrogen 12 7 - 30 mg/dL    Creatinine 0.78 0.66 - 1.25 mg/dL    Calcium 9.8 8.5 - 10.1 mg/dL    Glucose 84 70 - 99 mg/dL "    GFR Estimate >90 >60 mL/min/1.73m2   Asymptomatic COVID-19 Virus (Coronavirus) by PCR Nasopharyngeal     Status: Normal    Specimen: Nasopharyngeal; Swab   Result Value Ref Range    SARS CoV2 PCR Negative Negative    Narrative    Testing was performed using the amos  SARS-CoV-2 & Influenza A/B Assay on the amos  Desiree  System.  This test should be ordered for the detection of SARS-COV-2 in individuals who meet SARS-CoV-2 clinical and/or epidemiological criteria. Test performance is unknown in asymptomatic patients.  This test is for in vitro diagnostic use under the FDA EUA for laboratories certified under CLIA to perform moderate and/or high complexity testing. This test has not been FDA cleared or approved.  A negative test does not rule out the presence of PCR inhibitors in the specimen or target RNA in concentration below the limit of detection for the assay. The possibility of a false negative should be considered if the patient's recent exposure or clinical presentation suggests COVID-19.  [facility] are certified under the Clinical Laboratory Improvement Amendments of 1988 (CLIA-88) as qualified to perform moderate and/or high complexity laboratory testing.   CBC with platelets and differential     Status: None   Result Value Ref Range    WBC Count 9.9 4.0 - 11.0 10e3/uL    RBC Count 4.83 4.40 - 5.90 10e6/uL    Hemoglobin 14.4 13.3 - 17.7 g/dL    Hematocrit 43.4 40.0 - 53.0 %    MCV 90 78 - 100 fL    MCH 29.8 26.5 - 33.0 pg    MCHC 33.2 31.5 - 36.5 g/dL    RDW 11.7 10.0 - 15.0 %    Platelet Count 260 150 - 450 10e3/uL    % Neutrophils 71 %    % Lymphocytes 15 %    % Monocytes 9 %    % Eosinophils 5 %    % Basophils 0 %    % Immature Granulocytes 0 %    NRBCs per 100 WBC 0 <1 /100    Absolute Neutrophils 7.0 1.6 - 8.3 10e3/uL    Absolute Lymphocytes 1.5 0.8 - 5.3 10e3/uL    Absolute Monocytes 0.9 0.0 - 1.3 10e3/uL    Absolute Eosinophils 0.5 0.0 - 0.7 10e3/uL    Absolute Basophils 0.0  0.0 - 0.2 10e3/uL    Absolute Immature Granulocytes 0.0 <=0.4 10e3/uL    Absolute NRBCs 0.0 10e3/uL   Extra Blue Top Tube     Status: None   Result Value Ref Range    Hold Specimen JIC    Extra Red Top Tube     Status: None   Result Value Ref Range    Hold Specimen JIC    Basic metabolic panel     Status: Abnormal   Result Value Ref Range    Sodium 139 133 - 144 mmol/L    Potassium 3.3 (L) 3.4 - 5.3 mmol/L    Chloride 107 94 - 109 mmol/L    Carbon Dioxide (CO2) 28 20 - 32 mmol/L    Anion Gap 4 3 - 14 mmol/L    Urea Nitrogen 12 7 - 30 mg/dL    Creatinine 0.74 0.66 - 1.25 mg/dL    Calcium 8.9 8.5 - 10.1 mg/dL    Glucose 88 70 - 99 mg/dL    GFR Estimate >90 >60 mL/min/1.73m2   CBC with platelets     Status: Normal   Result Value Ref Range    WBC Count 9.2 4.0 - 11.0 10e3/uL    RBC Count 4.88 4.40 - 5.90 10e6/uL    Hemoglobin 14.5 13.3 - 17.7 g/dL    Hematocrit 43.9 40.0 - 53.0 %    MCV 90 78 - 100 fL    MCH 29.7 26.5 - 33.0 pg    MCHC 33.0 31.5 - 36.5 g/dL    RDW 11.9 10.0 - 15.0 %    Platelet Count 264 150 - 450 10e3/uL   CBC with platelets differential     Status: None    Narrative    The following orders were created for panel order CBC with platelets differential.  Procedure                               Abnormality         Status                     ---------                               -----------         ------                     CBC with platelets and d...[[id number]]                      Final result                 Please view results for these tests on the individual orders.   [place] Draw     Status: None    Narrative    The following orders were created for panel order [place] Draw.  Procedure                               Abnormality         Status                     ---------                               -----------         ------                     Extra Blue Top Tube[[id number]]                              Final result               Extra Red Top Tube[[id number]]                               Final  result                 Please view results for these tests on the individual orders.          Attestation:  I have reviewed today's vital signs, notes, medications, labs and imaging with Dr. Franklin.  Amount of time performed on this consult: 30 minutes.    Mary Magdaleno PA-C

## 2022-02-14 NOTE — UTILIZATION REVIEW
Concurrent stay review; Secondary Review Determination    Under the authority of the Utilization Management Committee, the utilization review process indicated a secondary review on the above patient. The review outcome is based on review of the medical records, discussions with staff, and applying clinical experience noted on the date of the review.    (x) Observation Status Appropriate - Concurrent stay review        RATIONALE FOR DETERMINATION: 37-year-old male who suffered a cat bite while trying to break up a fight between cats at his home on the dorsum of his right hand.  He went to urgent care and received a dose of intravenous clindamycin and was started on Augmentin.  However he had progressive swelling and worsening redness beyond the initial borders of the infection along with worsening pain and thus presented to the hospital.  Patient admitted to the hospital for right hand cat bite cellulitis with significant swelling, erythema and pain.  On hospital day 2 orthopedics recommends continue trial of IV antibiotics with right hand elevation.  If patient fails to improve by 2/15/2022 and requires surgical intervention, then at that time patient would be appropriate to advance to inpatient care.    Patient is clinically improving and there is no clear indication to change patient's status to inpatient. The severity of illness, intensity of service provided, expected LOS and risk for adverse outcome make the care appropriate for observation.    This document was produced using voice recognition software    The information on this document is developed by the utilization review team in order for the business office to ensure compliance. This only denotes the appropriateness of proper admission status and does not reflect the quality of care rendered.    The definitions of Inpatient Status and Observation Status used in making the determination above are those provided in the CMS Coverage Manual, Chapter 1 and  Chapter 6, section 70.4.    Sincerely,    Gaston Gutierrez MD  Utilization Review  Physician Advisor  [facility].

## 2022-02-15 VITALS
RESPIRATION RATE: 20 BRPM | OXYGEN SATURATION: 97 % | TEMPERATURE: 98.2 F | HEIGHT: 69 IN | SYSTOLIC BLOOD PRESSURE: 122 MMHG | WEIGHT: 218.3 LBS | HEART RATE: 73 BPM | BODY MASS INDEX: 32.33 KG/M2 | DIASTOLIC BLOOD PRESSURE: 87 MMHG

## 2022-02-15 LAB
ERYTHROCYTE [DISTWIDTH] IN BLOOD BY AUTOMATED COUNT: 11.7 % (ref 10–15)
HCT VFR BLD AUTO: 42.4 % (ref 40–53)
HGB BLD-MCNC: 13.9 G/DL (ref 13.3–17.7)
MCH RBC QN AUTO: 29.5 PG (ref 26.5–33)
MCHC RBC AUTO-ENTMCNC: 32.8 G/DL (ref 31.5–36.5)
MCV RBC AUTO: 90 FL (ref 78–100)
PLATELET # BLD AUTO: 259 10E3/UL (ref 150–450)
POTASSIUM BLD-SCNC: 3.6 MMOL/L (ref 3.4–5.3)
RBC # BLD AUTO: 4.71 10E6/UL (ref 4.4–5.9)
WBC # BLD AUTO: 8.1 10E3/UL (ref 4–11)

## 2022-02-15 PROCEDURE — 85027 COMPLETE CBC AUTOMATED: CPT | Performed by: PHYSICIAN ASSISTANT

## 2022-02-15 PROCEDURE — 84132 ASSAY OF SERUM POTASSIUM: CPT | Performed by: INTERNAL MEDICINE

## 2022-02-15 PROCEDURE — G0378 HOSPITAL OBSERVATION PER HR: HCPCS

## 2022-02-15 PROCEDURE — 250N000011 HC RX IP 250 OP 636: Performed by: PHYSICIAN ASSISTANT

## 2022-02-15 PROCEDURE — 96376 TX/PRO/DX INJ SAME DRUG ADON: CPT

## 2022-02-15 PROCEDURE — 250N000013 HC RX MED GY IP 250 OP 250 PS 637: Performed by: PHYSICIAN ASSISTANT

## 2022-02-15 PROCEDURE — 36415 COLL VENOUS BLD VENIPUNCTURE: CPT | Performed by: PHYSICIAN ASSISTANT

## 2022-02-15 PROCEDURE — 99217 PR OBSERVATION CARE DISCHARGE: CPT | Performed by: PHYSICIAN ASSISTANT

## 2022-02-15 RX ORDER — ACETAMINOPHEN 500 MG
500-1000 TABLET ORAL EVERY 6 HOURS PRN
Start: 2022-02-15 | End: 2023-04-21

## 2022-02-15 RX ORDER — OXYCODONE HYDROCHLORIDE 5 MG/1
5 TABLET ORAL EVERY 6 HOURS PRN
Qty: 10 TABLET | Refills: 0 | Status: SHIPPED | OUTPATIENT
Start: 2022-02-15 | End: 2023-04-21

## 2022-02-15 RX ORDER — SULFAMETHOXAZOLE/TRIMETHOPRIM 800-160 MG
1 TABLET ORAL 2 TIMES DAILY
Qty: 28 TABLET | Refills: 0 | Status: SHIPPED | OUTPATIENT
Start: 2022-02-15 | End: 2023-04-21

## 2022-02-15 RX ADMIN — ACETAMINOPHEN 975 MG: 325 TABLET, FILM COATED ORAL at 05:26

## 2022-02-15 RX ADMIN — AMPICILLIN SODIUM AND SULBACTAM SODIUM 3 G: 2; 1 INJECTION, POWDER, FOR SOLUTION INTRAMUSCULAR; INTRAVENOUS at 01:52

## 2022-02-15 RX ADMIN — AMPICILLIN SODIUM AND SULBACTAM SODIUM 3 G: 2; 1 INJECTION, POWDER, FOR SOLUTION INTRAMUSCULAR; INTRAVENOUS at 08:28

## 2022-02-15 NOTE — DISCHARGE SUMMARY
"[facility]  Discharge Summary  Hospitalist    Date of Admission:  2/13/2022  Date of Discharge:  2/15/2022  Discharging Provider: Kimberli Quach PA-C  Date of Service (when I saw the patient): 02/15/22    Discharge Diagnoses   Nonpurulent Cellulitis of right hand secondary to cat bite     History of Present Illness    Brent Brock is a 37 year old right hand dominant male with a PMH significant for diverticulitis with partial colon removal and abscess drainage who was admitted on 2/13/2022 with cellulitis secondary to cat bite.   Injury sustained \"breaking up fight between cats at his residence, bit in right hand 2/11\". Puncture site to dorsum of right hand.  Initially evaluated in urgent care on February 12 where tetanus vaccination was updated he was given IV clindamycin and started on a course of Augmentin. Presented given increasing erythema, swelling, pain in right hand.      On presentation to the ED, afebrile. Lab work unremarkable. No evidence of sepsis. Imaging of right hand not show fracture dislocation or sign suggestive of necrotizing soft tissue infection.  He was started on Unasyn and admitted to observation for further treatment.       Please see admitting H & P  by Mayi Linn PA-C, on 2/13/2022 for full details of the encounter.     Hospital Course   Brent Shukla was admitted on 2/13/2022.  The following problems were addressed during his hospitalization:    Non purulent cellulitis secondary to Cat bite to right hand - symptoms improving.  No current evidence of deeper space infection. No palmar pain. No lymphangitis. Neurovascular status intact. Orthopedics evaluated patient on admission with no recommendations for I & D, changed antibiotics to Bactrim. Discharge with outaptient follow up and return precautions.     Pending Results   None.    Code Status   Full Code       Primary Care Physician   Physician No Ref-Primary      INTERVAL HISTORY  No fever, " "chills.   Erythema improved, and pain mildly improving.  He has discomfort with extension of his right hand but able to flex, extend fingers. No palmar pain.   Able to make a fist      /74 (BP Location: Left arm)   Pulse 70   Temp 98.2  F (36.8  C) (Oral)   Resp 20   Ht 1.753 m (5' 9\")   Wt 99 kg (218 lb 4.8 oz)   SpO2 96%   BMI 32.24 kg/m        Constitutional:Awake, alert, no apparent distress  Respiratory:  Normal work of breathing.   Cardiovascular: Regular rate and rhythm, normal S1 and S2, and no murmur appreciated.   GI: Normal bowel sounds, soft, non-distended, non-tender.   Skin/Integument: No rashes, no cyanosis, no peripheral edema.  MK: puncture sight to dorsum of right hand at metatarsal region without purrulent discharge. No expanding erythema or streaking into forearm. Finger flexion and extension intact, with slight discomfort from swelling. No pain in palmar aspect. Radial pulses symmetrical. No elbow or wrist synovitis.   Neuro: Moving all extremities with normal strength. Coordination and sensation grossly intact. Speech clear. No focal deficits.   Psych: Appropriate affect.       Discharge Disposition   Discharged to home  Condition at discharge: Stable    Consultations This Hospital Stay   ORTHOPEDIC SURGERY IP CONSULT    Time Spent on this Encounter   IKimberli PA-C, personally saw the patient today and spent greater than 30 minutes discharging this patient.    Discharge Orders      Reason for your hospital stay    Right hand cat bite with cellulitis     Follow-up and recommended labs and tests     Follow up with Dr. Gonzalez if needed HERMAN Da Silva  [phone] (on call after hours)     Activity    Your activity upon discharge: activity as tolerated, ambulate in house, and no driving while on analgesics     Reason for your hospital stay    You were admitted for skin and soft tissue infection secondary to cat bite in hand.     Follow-up and recommended labs and " tests     -Follow-up with your PCP in 7-10 days if not improving or if at anytime worsening  -Dr. Gonzalez [facility] ([place] or [place]) [phone] (On call)     Activity    Your activity upon discharge: activity as tolerated     When to contact your care team    Call your primary care doctor if you have any of the following: temperature greater than 101 F, worsening shortness of breath, increased swelling, worsening pain, new or unrelenting diarrhea, or any other concerning symptoms. Call 911 or go to the emergency room if you need immediate assistance.     Diet    Follow this diet upon discharge: Orders Placed This Encounter      Regular Diet Adult     Diet    Follow this diet upon discharge: Orders Placed This Encounter      Regular Diet Adult      Diet     Discharge Medications   Current Discharge Medication List      START taking these medications    Details   !! acetaminophen (TYLENOL) 500 MG tablet Take 1-2 tablets (500-1,000 mg) by mouth every 6 hours as needed for mild pain (alternate with ibuprofen)    Associated Diagnoses: Cat bite, initial encounter      oxyCODONE (ROXICODONE) 5 MG tablet Take 1 tablet (5 mg) by mouth every 6 hours as needed for moderate to severe pain  Qty: 10 tablet, Refills: 0    Associated Diagnoses: Cat bite, initial encounter; Cellulitis of hand      sulfamethoxazole-trimethoprim (BACTRIM DS) 800-160 MG tablet Take 1 tablet by mouth 2 times daily  Qty: 28 tablet, Refills: 0    Associated Diagnoses: Cat bite, initial encounter; Cellulitis of hand       !! - Potential duplicate medications found. Please discuss with provider.      CONTINUE these medications which have NOT CHANGED    Details   !! acetaminophen (TYLENOL) 325 MG tablet Take 325-650 mg by mouth every 4 hours as needed for mild pain      ibuprofen (ADVIL/MOTRIN) 200 MG tablet Take 400 mg by mouth every 4 hours as needed for mild pain or inflammatory pain       !! - Potential duplicate medications found. Please discuss with  provider.      STOP taking these medications       amoxicillin-clavulanate (AUGMENTIN) 875-125 MG tablet Comments:   Reason for Stopping:             Allergies   Allergies   Allergen Reactions     No Known Drug Allergies      Data   Most Recent 3 CBC's:Recent Labs   Lab Test 02/15/22  0524 02/14/22  0534 02/13/22  1820   WBC 8.1 9.2 9.9   HGB 13.9 14.5 14.4   MCV 90 90 90    264 260      Most Recent 3 BMP's:  Recent Labs   Lab Test 02/15/22  0524 02/14/22  0534 02/13/22  1820 01/20/14  0000   NA  --  139 139  --    POTASSIUM 3.6 3.3* 3.7 4.2   CHLORIDE  --  107 106  --    CO2  --  28 27  --    BUN  --  12 12  --    CR  --  0.74 0.78  --    ANIONGAP  --  4 6  --    AILYN  --  8.9 9.8  --    GLC  --  88 84 99     Most Recent 2 LFT's:No lab results found.  Most Recent INR's and Anticoagulation Dosing History:  Anticoagulation Dose History    There is no flowsheet data to display.       Most Recent 3 Troponin's:No lab results found.  Most Recent Cholesterol Panel:No lab results found.  Most Recent 6 Bacteria Isolates From Any Culture (See EPIC Reports for Culture Details):No lab results found.  Most Recent TSH, T4 and A1c Labs:  Recent Labs   Lab Test 01/20/14  0000   TSH 0.92     Results for orders placed or performed during the hospital encounter of 02/13/22   XR Hand Right G/E 3 Views    Narrative    EXAM: XR HAND RT G/E 3 VW  LOCATION: [facility]  DATE/TIME: 2/13/2022 7:10 PM    INDICATION: pain swelling after cat bite  COMPARISON: None.      Impression    IMPRESSION: Soft tissue swelling the dorsum of the metacarpal region. No cortical erosion to suggest osteomyelitis. No fractures are evident. Normal joint spacing and alignment.       Kimberli Quach PA-C  [facility]

## 2022-02-15 NOTE — PROGRESS NOTES
Ortho progress note    Patient notes mild improvement in right hand    On exam   Right long finger ROM mildly improved  Dorsal hand swelling improved    Plan  Ok from ortho perspective to discharge on oral Bactrim  Follow-up if not improving or if at anytime worsening  Dr. Lisa SUTTON ([place] or [place]) [phone] (On call)    Mary Magdaleno PAC

## 2022-02-15 NOTE — PLAN OF CARE
PRIMARY DIAGNOSIS: SOFT TISSUE INFECTIONS  OUTPATIENT/OBSERVATION GOALS TO BE MET BEFORE DISCHARGE:  1. Vitals sign stable or return to baseline: Yes    2. Tolerating oral antibiotics or has home infusion set up if applicable:  continues on iv abx at this time but is tolerating oral intake     3. Pain status: Scheduled Tylenol administered for pain 6/10 in the RT hand     4. Return to near baseline physical activity: Yes    Discharge Planner Nurse   Safe discharge environment identified: Yes  Barriers to discharge: Not onced medically cleared       Entered by: Alka Emanuel 02/15/2022 1:14 AM     Patient is Aox4, VS WDL, up independent in room, pain noted at 2130 6/10 scheduled Tylenol administered, will continue to monitor.      Please review provider order for any additional goals.     Nurse to notify provider when observation goals have been met and patient is ready for discharge.

## 2022-02-15 NOTE — PLAN OF CARE
PRIMARY DIAGNOSIS: SOFT TISSUE INFECTIONS  OUTPATIENT/OBSERVATION GOALS TO BE MET BEFORE DISCHARGE:  1. Vitals sign stable or return to baseline: Yes    2. Tolerating oral antibiotics or has home infusion set up if applicable:  continues on iv abx at this time but is tolerating oral intake     3. Pain status: has tylenol and ibuprofen for pain control     4. Return to near baseline physical activity: Yes    Discharge Planner Nurse   Safe discharge environment identified: Yes  Barriers to discharge: Not onced medically cleared       Entered by: Alka Emanuel 02/14/2022 9:34 PM     Patient is Aox4, VS WDL, up independent in room, no pain noted at this time, IV antibiotic running at this time, will continue to monitor.      Please review provider order for any additional goals.     Nurse to notify provider when observation goals have been met and patient is ready for discharge.

## 2022-02-15 NOTE — PLAN OF CARE
Patient's After Visit Summary was reviewed with patient.   Patient verbalized understanding of After Visit Summary, recommended follow up and was given an opportunity to ask questions.   Discharge medications sent home with patient/family: pt given printed prescription & will  bactrim at outside pharmacy.    Discharged with other: mother in law.

## 2022-02-15 NOTE — PLAN OF CARE
PRIMARY DIAGNOSIS: SOFT TISSUE INFECTIONS  OUTPATIENT/OBSERVATION GOALS TO BE MET BEFORE DISCHARGE:  Vitals sign stable or return to baseline: Yes    Tolerating oral antibiotics or has home infusion set up if applicable:  on IV antibiotics for now.     Pain status: Improved-controlled with oral pain medications.    Return to near baseline physical activity: Yes    Discharge Planner Nurse   Safe discharge environment identified: Yes  Barriers to discharge: Yes, will need ortho consult today.        Entered by: Rochelle Connell 02/15/2022 8:49 AM     Pt elevating hand on pillows above heart. NPO for ortho eval today. I&D vs home w/ po antibx.   Please review provider order for any additional goals.     Nurse to notify provider when observation goals have been met and patient is ready for discharge.

## 2022-02-15 NOTE — PLAN OF CARE
PRIMARY DIAGNOSIS: SOFT TISSUE INFECTIONS  OUTPATIENT/OBSERVATION GOALS TO BE MET BEFORE DISCHARGE:  1. Vitals sign stable or return to baseline: Yes    2. Tolerating oral antibiotics or has home infusion set up if applicable:  continues on iv abx at this time but is tolerating oral intake     3. Pain status: having pain, controlled with scheduled Tylenol and PRN Ibuprofen     4. Return to near baseline physical activity: Yes    Discharge Planner Nurse   Safe discharge environment identified: Yes  Barriers to discharge: Not onced medically cleared       Entered by: Alka Emanuel 02/15/2022 3:57 AM     Patient is Aox4, VS WDL, up independent in room, pain noted at this time but will wait for scheduled Tylenol at 0600, will continue to monitor.      Please review provider order for any additional goals.     Nurse to notify provider when observation goals have been met and patient is ready for discharge.

## 2022-02-15 NOTE — DISCHARGE INSTRUCTIONS
Elevate your right hand above your heart at rest, use ice PRN for pain. Alternated tylenol and ibuprofen

## 2023-04-21 ENCOUNTER — HOSPITAL ENCOUNTER (EMERGENCY)
Facility: CLINIC | Age: 38
Discharge: HOME | End: 2023-04-21
Attending: EMERGENCY MEDICINE | Admitting: EMERGENCY MEDICINE
Payer: COMMERCIAL

## 2023-04-21 VITALS
DIASTOLIC BLOOD PRESSURE: 118 MMHG | TEMPERATURE: 97.5 F | SYSTOLIC BLOOD PRESSURE: 164 MMHG | HEART RATE: 112 BPM | OXYGEN SATURATION: 96 % | RESPIRATION RATE: 20 BRPM

## 2023-04-21 DIAGNOSIS — I47.10 PAROXYSMAL SUPRAVENTRICULAR TACHYCARDIA (H): ICD-10-CM

## 2023-04-21 LAB
ATRIAL RATE - MUSE: 120 BPM
ATRIAL RATE - MUSE: 176 BPM
DIASTOLIC BLOOD PRESSURE - MUSE: NORMAL MMHG
DIASTOLIC BLOOD PRESSURE - MUSE: NORMAL MMHG
INTERPRETATION ECG - MUSE: NORMAL
INTERPRETATION ECG - MUSE: NORMAL
P AXIS - MUSE: 59 DEGREES
P AXIS - MUSE: 83 DEGREES
PR INTERVAL - MUSE: 144 MS
PR INTERVAL - MUSE: 152 MS
QRS DURATION - MUSE: 84 MS
QRS DURATION - MUSE: 96 MS
QT - MUSE: 276 MS
QT - MUSE: 330 MS
QTC - MUSE: 466 MS
QTC - MUSE: 472 MS
R AXIS - MUSE: 40 DEGREES
R AXIS - MUSE: 62 DEGREES
SYSTOLIC BLOOD PRESSURE - MUSE: NORMAL MMHG
SYSTOLIC BLOOD PRESSURE - MUSE: NORMAL MMHG
T AXIS - MUSE: -16 DEGREES
T AXIS - MUSE: 52 DEGREES
VENTRICULAR RATE- MUSE: 120 BPM
VENTRICULAR RATE- MUSE: 176 BPM

## 2023-04-21 PROCEDURE — 99284 EMERGENCY DEPT VISIT MOD MDM: CPT

## 2023-04-21 PROCEDURE — 93005 ELECTROCARDIOGRAM TRACING: CPT | Mod: 76

## 2023-04-21 PROCEDURE — 93005 ELECTROCARDIOGRAM TRACING: CPT

## 2023-04-21 RX ORDER — ADENOSINE 3 MG/ML
INJECTION, SOLUTION INTRAVENOUS
Status: DISCONTINUED
Start: 2023-04-21 | End: 2023-04-21 | Stop reason: WASHOUT

## 2023-04-21 ASSESSMENT — ENCOUNTER SYMPTOMS
PALPITATIONS: 1
FEVER: 0

## 2023-04-21 NOTE — ED TRIAGE NOTES
"Pt arrives complaining of \"racing heart\" for greater than 20 mins. Drove self here. States this has happened before but pt comes out of it on own but not today. Feels short of breath with discomfort.    Patient taken to EKG room immediately. Charge RN called for room.   Patient roomed before triage finished. Dr. Gilliland given EKG     Triage Assessment     Row Name 04/21/23 0155       Triage Assessment (Adult)    Airway WDL WDL       Respiratory WDL    Respiratory WDL X       Skin Circulation/Temperature WDL    Skin Circulation/Temperature WDL WDL       Cardiac WDL    Cardiac WDL X;rhythm       Peripheral/Neurovascular WDL    Peripheral Neurovascular WDL WDL       Cognitive/Neuro/Behavioral WDL    Cognitive/Neuro/Behavioral WDL WDL              "

## 2023-04-21 NOTE — ED PROVIDER NOTES
History     Chief Complaint:  Palpitations     HPI   Brent Brock is a 38 year old male who presents with heart palpitations. The patient states that he was in bed tonight and his cat jumped up unto him and he suddenly developed heart palpitations. He states that he has had 10 episodes of this in the past year but normally comes out of it and was never seen before. He denies a fever. He denies any drug use tonight    Independent Historian:   None - Patient Only    Review of External Notes: Care Everywhere reviewed and Epic updated.      Review of Systems   Constitutional: Negative for fever.   Cardiovascular: Positive for palpitations.   All other systems reviewed and are negative.    Allergies:  No Known Drug Allergies     Medications:    The patient is currently on no regular medications.    Past Medical History:     ADHD  Migraine  DVT    Past Surgical History:    Eyelid biopsy   Sigmoidectomy    Family History:    Mother-HTN, diabetes  Father-Colon polyps, kidney cancer  Sister-Depression, colon cancer    Social History:  The patient presents to the ED alone.    Physical Exam     Patient Vitals for the past 24 hrs:   BP Temp Temp src Pulse Resp SpO2   04/21/23 0219  164/118 -- -- 112 -- 96 %   04/21/23 0209 -- -- --  122 -- 97 %   04/21/23 0159  155/118 -- --  186 -- --   04/21/23 0156  147/118 97.5  F (36.4  C) Temporal 197 20 98 %      Physical Exam  Nursing note and vitals reviewed.  Constitutional: Cooperative.   HENT:   Mouth/Throat: Mucous membranes are normal.   Cardiovascular: Tachycardic, regular rhythm and normal heart sounds.  No murmur.  Pulmonary/Chest: Effort normal and breath sounds normal. No respiratory distress. No wheezes. No rales.   Abdominal: Soft. Normal appearance. There is no tenderness.   Neurological: Alert. Oriented x4.  Skin: Skin is warm and dry.   Psychiatric: Normal mood and affect.     Emergency Department Course     ECG:  This ECG was taken at 0152, and signed at  0200  Paroxysmal SVT  Marked St abnormality, possible inferior subendocardial injury  Vent. rate 176, NM interval 152, QRS Duration 96, QT/QTc 276/472, P-R-T axes 83 62 -16    ECG after cardioversion:  This ECG was taken at 0213, and signed at 0217  Sinus tachycardia  Nonspecific ST abnormality  Vent. rate 120, NM interval 144, QRS Duration 84, QT/QTc 330/466, P-R-T axes 59 40 52    Procedures   Cardioversion         Procedure: Cardioversion - Vagal maneuver       Indication: SVT     Consent: Verbal from Patient     Anesthesia/Sedation: None     Procedure Detail:   Performed cardioversion using vagal maneuvers (breath holding) and leg raise. Converted to sinus rhythm on first attempt.      Patient Status:  The patient tolerated the procedure well: Yes. There were no complications.    Assessments:  0202 Obtained the patients history and performed initial exam    Social Determinants of Health affecting care:   None    Disposition:  The patient was discharged to home.     Impression & Plan      Medical Decision Making:  Brent Brock is a 38 year old male who presents with tachycardia and palpitations. Broad differential diagnosis considered including SVT, sinus tachycardia, re-entrant tachycardia for the narrow complex regular tachycardia that she presents with by ECG.  Signs and symptoms here are consistent with SVT.  This was successfully cardioverted using vagal maneuvers and leg left as above.  Post-conversion ECG looks excellent.  No other symptoms and no indication at this point to check d-dimer, trop, stress echo, etc. given the recurrence of the symptoms he has been referred to outpatient cardiology for evaluation and consideration of ablation procedure.    Diagnosis:    ICD-10-CM    1. Paroxysmal supraventricular tachycardia (H)  I47.1 Adult Cardiology [facility] Referral          Scribe Disclosure:  Adin CARRIZALES am serving as a scribe at 2:11 AM on 4/21/2023 to document services personally  performed by Rj Gilliland MD based on my observations and the provider's statements to me.     4/21/2023   Rj Gilliland MD Amdahl, John, MD  04/21/23 0257

## 2023-05-03 ENCOUNTER — OFFICE VISIT (OUTPATIENT)
Dept: CARDIOLOGY | Facility: CLINIC | Age: 38
End: 2023-05-03
Attending: EMERGENCY MEDICINE
Payer: COMMERCIAL

## 2023-05-03 VITALS
OXYGEN SATURATION: 99 % | DIASTOLIC BLOOD PRESSURE: 98 MMHG | SYSTOLIC BLOOD PRESSURE: 144 MMHG | HEART RATE: 102 BPM | HEIGHT: 69 IN | WEIGHT: 214 LBS | BODY MASS INDEX: 31.7 KG/M2

## 2023-05-03 DIAGNOSIS — I47.10 PAROXYSMAL SUPRAVENTRICULAR TACHYCARDIA (H): ICD-10-CM

## 2023-05-03 PROCEDURE — 99204 OFFICE O/P NEW MOD 45 MIN: CPT | Performed by: INTERNAL MEDICINE

## 2023-05-03 NOTE — PROGRESS NOTES
"Electrophysiology/ Clinic Note         H&P and Plan:     REASON FOR VISIT: Electrophysiology evaluation.      HISTORY OF PRESENT ILLNESS: Patient is a pleasant 38-year-old gentleman with a history of diverticulitis and paroxysmal SVT, who is here for evaluation.    Patient informs that he started having episodes of palpitation in the last 2 years.  Here for monitoring average she has 1 episode of 6 months which last between 30 minutes to an hour, and self terminated.  On 4/21, he presents with an episode of SVT which did not terminate.  He came to the ED for evaluation EKG confirmed SVT.  Tachycardia terminated with Valsalva maneuvers.    Today, he informs he is doing well.  He denies any recurrence of SVT since recent ED visit.  He denies any symptoms of chest pain or shortness of breath.    Baseline EKG shows sinus rhythm without clear signs of exacerbation.    ASSESSMENT AND PLAN:   1.  Paroxysmal SVT.  I had an extensive discussion with patient regarding nature of SVT.  Options of doing nothing, start pharmacotherapy or pursue cath ablation procedure.  I explained ablation procedure in details.  He understand there is a 1-2% risk of cases or procedure.    After discussion, he will like to think about the options.  We will obtain an echo and follow-up with him in a week to finalize plan.        Onesimo Dinh MD    Physical Exam:  Vitals: BP (!) 144/98 (BP Location: Right arm, Patient Position: Sitting, Cuff Size: Adult Large)   Pulse 102   Ht 1.753 m (5' 9\")   Wt 97.1 kg (214 lb)   SpO2 99%   BMI 31.60 kg/m      Constitutional:  AAO x3.  Pt is in NAD.  HEAD: normocephalic.  SKIN: Skin normal color, texture and turgor with no lesions or eruptions.  Eyes: PERRL, EOMI.  ENT:  Supple, normal JVP. No lymphadenopathy or thyroid enlargement.  Chest:  CTAB.  Cardiac:   RRR, normal  S1 and S2.  No murmurs rubs or gallop.   Abdomen:  Normal BS.  Soft, non-tender and non-distended.  No rebound or guarding.  "   Extremities:  Pedious pulses palpable B/L.  No LE edema noticed.   Neurological: Strength and sensation grossly symmetric and intact throughout.       CURRENT MEDICATIONS:  No current outpatient medications on file.       ALLERGIES     Allergies   Allergen Reactions     No Known Drug Allergy        PAST MEDICAL HISTORY:  Past Medical History:   Diagnosis Date     ADHD (attention deficit hyperactivity disorder)      Diverticulitis of colon      Paroxysmal supraventricular tachycardia        PAST SURGICAL HISTORY:  Past Surgical History:   Procedure Laterality Date     HC BIOPSY OF EYELID      Benign growth     SIGMOIDECTOMY         FAMILY HISTORY:  The patient's family history was reviewed and is non-contributory for heart disease.    SOCIAL HISTORY:  Social History     Socioeconomic History     Marital status:    Tobacco Use     Smoking status: Every Day     Tobacco comments:     1 ppd   Substance and Sexual Activity     Alcohol use: No     Drug use: No     Sexual activity: Yes     Partners: Female     Birth control/protection: Condom       Review of Systems:  Skin:        Eyes:       ENT:       Respiratory:       Cardiovascular:       Gastroenterology:      Genitourinary:       Musculoskeletal:       Neurologic:       Psychiatric:       Heme/Lymph/Imm:       Endocrine:           Recent Lab Results:  LIPID RESULTS:  No results found for: CHOL, HDL, LDL, TRIG, CHOLHDLRATIO    LIVER ENZYME RESULTS:  No results found for: AST, ALT    CBC RESULTS:  Lab Results   Component Value Date    WBC 8.1 02/15/2022    RBC 4.71 02/15/2022    HGB 13.9 02/15/2022    HCT 42.4 02/15/2022    MCV 90 02/15/2022    MCH 29.5 02/15/2022    MCHC 32.8 02/15/2022    RDW 11.7 02/15/2022     02/15/2022       BMP RESULTS:  Lab Results   Component Value Date     02/14/2022    POTASSIUM 3.6 02/15/2022    POTASSIUM 4.2 01/20/2014    CHLORIDE 107 02/14/2022    CO2 28 02/14/2022    ANIONGAP 4 02/14/2022    GLC 88 02/14/2022    GLC  99 01/20/2014    BUN 12 02/14/2022    CR 0.74 02/14/2022    GFRESTIMATED >90 02/14/2022    GFRESTIMATED >60 01/20/2014    GFRESTBLACK >60 01/20/2014    AILYN 8.9 02/14/2022        A1C RESULTS:  No results found for: A1C    INR RESULTS:  No results found for: INR      ECHOCARDIOGRAM  No results found for this or any previous visit (from the past 8760 hour(s)).      No orders of the defined types were placed in this encounter.    No orders of the defined types were placed in this encounter.    There are no discontinued medications.      Encounter Diagnosis   Name Primary?     Paroxysmal supraventricular tachycardia (H)          CC  Rj Gilliland MD  [facility]  [address] [place] [address]

## 2023-05-03 NOTE — LETTER
"5/3/2023    Physician No Ref-Primary  No address on file    RE: Brent ROY Vinod       Dear Colleague,     I had the pleasure of seeing Brent Brock in the [facility].  Electrophysiology/ Clinic Note         H&P and Plan:     REASON FOR VISIT: Electrophysiology evaluation.      HISTORY OF PRESENT ILLNESS: Patient is a pleasant 38-year-old gentleman with a history of diverticulitis and paroxysmal SVT, who is here for evaluation.    Patient informs that he started having episodes of palpitation in the last 2 years.  Here for monitoring average she has 1 episode of 6 months which last between 30 minutes to an hour, and self terminated.  On 4/21, he presents with an episode of SVT which did not terminate.  He came to the ED for evaluation EKG confirmed SVT.  Tachycardia terminated with Valsalva maneuvers.    Today, he informs he is doing well.  He denies any recurrence of SVT since recent ED visit.  He denies any symptoms of chest pain or shortness of breath.    Baseline EKG shows sinus rhythm without clear signs of exacerbation.    ASSESSMENT AND PLAN:   1.  Paroxysmal SVT.  I had an extensive discussion with patient regarding nature of SVT.  Options of doing nothing, start pharmacotherapy or pursue cath ablation procedure.  I explained ablation procedure in details.  He understand there is a 1-2% risk of cases or procedure.    After discussion, he will like to think about the options.  We will obtain an echo and follow-up with him in a week to finalize plan.        Onesimo Dinh MD    Physical Exam:  Vitals: BP (!) 144/98 (BP Location: Right arm, Patient Position: Sitting, Cuff Size: Adult Large)   Pulse 102   Ht 1.753 m (5' 9\")   Wt 97.1 kg (214 lb)   SpO2 99%   BMI 31.60 kg/m      Constitutional:  AAO x3.  Pt is in NAD.  HEAD: normocephalic.  SKIN: Skin normal color, texture and turgor with no lesions or eruptions.  Eyes: PERRL, EOMI.  ENT:  Supple, normal JVP. No lymphadenopathy " or thyroid enlargement.  Chest:  CTAB.  Cardiac:   RRR, normal  S1 and S2.  No murmurs rubs or gallop.   Abdomen:  Normal BS.  Soft, non-tender and non-distended.  No rebound or guarding.    Extremities:  Pedious pulses palpable B/L.  No LE edema noticed.   Neurological: Strength and sensation grossly symmetric and intact throughout.       CURRENT MEDICATIONS:  No current outpatient medications on file.       ALLERGIES     Allergies   Allergen Reactions    No Known Drug Allergy        PAST MEDICAL HISTORY:  Past Medical History:   Diagnosis Date    ADHD (attention deficit hyperactivity disorder)     Diverticulitis of colon     Paroxysmal supraventricular tachycardia        PAST SURGICAL HISTORY:  Past Surgical History:   Procedure Laterality Date    HC BIOPSY OF EYELID      Benign growth    SIGMOIDECTOMY         FAMILY HISTORY:  The patient's family history was reviewed and is non-contributory for heart disease.    SOCIAL HISTORY:  Social History     Socioeconomic History    Marital status:    Tobacco Use    Smoking status: Every Day    Tobacco comments:     1 ppd   Substance and Sexual Activity    Alcohol use: No    Drug use: No    Sexual activity: Yes     Partners: Female     Birth control/protection: Condom       Review of Systems:  Skin:        Eyes:       ENT:       Respiratory:       Cardiovascular:       Gastroenterology:      Genitourinary:       Musculoskeletal:       Neurologic:       Psychiatric:       Heme/Lymph/Imm:       Endocrine:           Recent Lab Results:  LIPID RESULTS:  No results found for: CHOL, HDL, LDL, TRIG, CHOLHDLRATIO    LIVER ENZYME RESULTS:  No results found for: AST, ALT    CBC RESULTS:  Lab Results   Component Value Date    WBC 8.1 02/15/2022    RBC 4.71 02/15/2022    HGB 13.9 02/15/2022    HCT 42.4 02/15/2022    MCV 90 02/15/2022    MCH 29.5 02/15/2022    MCHC 32.8 02/15/2022    RDW 11.7 02/15/2022     02/15/2022       BMP RESULTS:  Lab Results   Component Value Date      02/14/2022    POTASSIUM 3.6 02/15/2022    POTASSIUM 4.2 01/20/2014    CHLORIDE 107 02/14/2022    CO2 28 02/14/2022    ANIONGAP 4 02/14/2022    GLC 88 02/14/2022    GLC 99 01/20/2014    BUN 12 02/14/2022    CR 0.74 02/14/2022    GFRESTIMATED >90 02/14/2022    GFRESTIMATED >60 01/20/2014    GFRESTBLACK >60 01/20/2014    AILYN 8.9 02/14/2022        A1C RESULTS:  No results found for: A1C    INR RESULTS:  No results found for: INR    ECHOCARDIOGRAM  No results found for this or any previous visit (from the past 8760 hour(s)).    No orders of the defined types were placed in this encounter.    No orders of the defined types were placed in this encounter.    There are no discontinued medications.      Encounter Diagnosis   Name Primary?    Paroxysmal supraventricular tachycardia (H)      CC  Rj Gilliland MD  [facility]  [address] [place] [address]      Thank you for allowing me to participate in the care of your patient.      Sincerely,     Onesimo Dinh MD     [facility]

## 2023-05-24 ENCOUNTER — HOSPITAL ENCOUNTER (OUTPATIENT)
Dept: CARDIOLOGY | Facility: CLINIC | Age: 38
Discharge: HOME | End: 2023-05-24
Attending: INTERNAL MEDICINE | Admitting: INTERNAL MEDICINE
Payer: COMMERCIAL

## 2023-05-24 DIAGNOSIS — I47.10 PAROXYSMAL SUPRAVENTRICULAR TACHYCARDIA (H): ICD-10-CM

## 2023-05-24 LAB
BI-PLANE LVEF ECHO: NORMAL
LVEF ECHO: NORMAL

## 2023-05-24 PROCEDURE — 999N000208 ECHOCARDIOGRAM COMPLETE

## 2023-05-24 PROCEDURE — 93306 TTE W/DOPPLER COMPLETE: CPT | Mod: 26 | Performed by: INTERNAL MEDICINE

## 2023-05-24 PROCEDURE — 255N000002 HC RX 255 OP 636: Performed by: INTERNAL MEDICINE

## 2023-05-24 RX ADMIN — HUMAN ALBUMIN MICROSPHERES AND PERFLUTREN 3 ML: 10; .22 INJECTION, SOLUTION INTRAVENOUS at 14:09

## 2023-05-25 ENCOUNTER — TELEPHONE (OUTPATIENT)
Dept: CARDIOLOGY | Facility: CLINIC | Age: 38
End: 2023-05-25
Payer: COMMERCIAL

## 2023-05-25 DIAGNOSIS — I47.10 SVT (SUPRAVENTRICULAR TACHYCARDIA) (H): ICD-10-CM

## 2023-05-25 DIAGNOSIS — R93.1 ABNORMAL ECHOCARDIOGRAM: Primary | ICD-10-CM

## 2023-05-25 NOTE — TELEPHONE ENCOUNTER
Spoke to patient regarding recommendations per Dr Dinh:  Echocardiogram showed moderate to severe LV dysfunction, which is a new diagnosis for him.  Patient saw me due to an episode of SVT.  I would like to obtain a monitor to evaluate rhythm.  We should also schedule patient for CTA to rule out CAD.  Patient provided verbal understanding regarding above.  Scheduling to call patient to get this set up.  DONN Castillo

## 2023-05-25 NOTE — TELEPHONE ENCOUNTER
Verbal clarification of leadless monitor per Dr Dinh:  ---patient to wear 14 day leadless monitor  Orders placed in epic for leadless monitor and CTA.  DONN Castillo

## 2023-05-25 NOTE — TELEPHONE ENCOUNTER
----- Message from Onesimo Dinh MD sent at 5/24/2023  3:17 PM CDT -----  Echocardiogram showed moderate to severe LV dysfunction, which is a new diagnosis for him.  Patient saw me due to an episode of SVT.  I would like to obtain a monitor to evaluate rhythm.  We should also schedule patient for CTA to rule out CAD.    Please update patient results and plan.      Onesimo

## 2023-06-07 ENCOUNTER — HOSPITAL ENCOUNTER (OUTPATIENT)
Dept: CARDIOLOGY | Facility: CLINIC | Age: 38
Discharge: HOME | End: 2023-06-07
Attending: INTERNAL MEDICINE | Admitting: INTERNAL MEDICINE
Payer: COMMERCIAL

## 2023-06-07 DIAGNOSIS — I47.10 SVT (SUPRAVENTRICULAR TACHYCARDIA) (H): ICD-10-CM

## 2023-06-07 PROCEDURE — 93246 EXT ECG>7D<15D RECORDING: CPT

## 2023-06-07 PROCEDURE — 93248 EXT ECG>7D<15D REV&INTERPJ: CPT | Performed by: INTERNAL MEDICINE

## 2023-06-08 ENCOUNTER — TELEPHONE (OUTPATIENT)
Dept: CARDIOLOGY | Facility: CLINIC | Age: 38
End: 2023-06-08
Payer: COMMERCIAL

## 2023-06-08 NOTE — TELEPHONE ENCOUNTER
6/8/23Spoke w EKG dept who recommended putting Tegaderm or medical  tape over monitor to try and keep it attached   Attempted to call pt but reached CARLEY ELIZALDE and requested callback. Duyen 1015 am

## 2023-06-08 NOTE — TELEPHONE ENCOUNTER
[facility] Call Center    Phone Message    May a detailed message be left on voicemail: yes     Reason for Call: Other: Patient had zio patch placed 6/7 and it has fallen off. Please call [employer] with further instructions.      Action Taken: Other: cardio    Travel Screening: Not Applicable

## 2023-06-08 NOTE — TELEPHONE ENCOUNTER
6/8/23 Pt called back and left another message. Attempted to call back, reached VM, left detailed message o apply Tegaderm or medical tape over ZP and if that does not work to call back  Duyen 135 pm

## 2023-07-25 ENCOUNTER — HOSPITAL ENCOUNTER (OUTPATIENT)
Dept: CARDIOLOGY | Facility: CLINIC | Age: 38
Discharge: HOME | End: 2023-07-25
Attending: INTERNAL MEDICINE | Admitting: INTERNAL MEDICINE
Payer: COMMERCIAL

## 2023-07-25 VITALS — SYSTOLIC BLOOD PRESSURE: 142 MMHG | DIASTOLIC BLOOD PRESSURE: 91 MMHG | HEART RATE: 75 BPM

## 2023-07-25 DIAGNOSIS — R93.1 ABNORMAL ECHOCARDIOGRAM: ICD-10-CM

## 2023-07-25 PROCEDURE — 250N000009 HC RX 250: Performed by: INTERNAL MEDICINE

## 2023-07-25 PROCEDURE — 250N000011 HC RX IP 250 OP 636: Performed by: INTERNAL MEDICINE

## 2023-07-25 PROCEDURE — 75574 CT ANGIO HRT W/3D IMAGE: CPT

## 2023-07-25 PROCEDURE — 75574 CT ANGIO HRT W/3D IMAGE: CPT | Mod: 26 | Performed by: INTERNAL MEDICINE

## 2023-07-25 PROCEDURE — 250N000013 HC RX MED GY IP 250 OP 250 PS 637: Performed by: INTERNAL MEDICINE

## 2023-07-25 RX ORDER — IOPAMIDOL 755 MG/ML
500 INJECTION, SOLUTION INTRAVASCULAR ONCE
Status: COMPLETED | OUTPATIENT
Start: 2023-07-25 | End: 2023-07-25

## 2023-07-25 RX ORDER — DILTIAZEM HCL 60 MG
120 TABLET ORAL
Status: DISCONTINUED | OUTPATIENT
Start: 2023-07-25 | End: 2023-07-26 | Stop reason: HOSPADM

## 2023-07-25 RX ORDER — DILTIAZEM HYDROCHLORIDE 5 MG/ML
10-15 INJECTION INTRAVENOUS
Status: DISCONTINUED | OUTPATIENT
Start: 2023-07-25 | End: 2023-07-26 | Stop reason: HOSPADM

## 2023-07-25 RX ORDER — ACYCLOVIR 200 MG/1
0-1 CAPSULE ORAL
Status: DISCONTINUED | OUTPATIENT
Start: 2023-07-25 | End: 2023-07-26 | Stop reason: HOSPADM

## 2023-07-25 RX ORDER — IVABRADINE 5 MG/1
5-15 TABLET, FILM COATED ORAL
Status: COMPLETED | OUTPATIENT
Start: 2023-07-25 | End: 2023-07-25

## 2023-07-25 RX ORDER — DIPHENHYDRAMINE HYDROCHLORIDE 50 MG/ML
25-50 INJECTION INTRAMUSCULAR; INTRAVENOUS
Status: DISCONTINUED | OUTPATIENT
Start: 2023-07-25 | End: 2023-07-26 | Stop reason: HOSPADM

## 2023-07-25 RX ORDER — NITROGLYCERIN 0.4 MG/1
0.4 TABLET SUBLINGUAL
Status: DISCONTINUED | OUTPATIENT
Start: 2023-07-25 | End: 2023-07-26 | Stop reason: HOSPADM

## 2023-07-25 RX ORDER — ONDANSETRON 2 MG/ML
4 INJECTION INTRAMUSCULAR; INTRAVENOUS
Status: DISCONTINUED | OUTPATIENT
Start: 2023-07-25 | End: 2023-07-26 | Stop reason: HOSPADM

## 2023-07-25 RX ORDER — METOPROLOL TARTRATE 25 MG/1
25-100 TABLET, FILM COATED ORAL
Status: COMPLETED | OUTPATIENT
Start: 2023-07-25 | End: 2023-07-25

## 2023-07-25 RX ORDER — METOPROLOL TARTRATE 1 MG/ML
5-15 INJECTION, SOLUTION INTRAVENOUS
Status: DISCONTINUED | OUTPATIENT
Start: 2023-07-25 | End: 2023-07-26 | Stop reason: HOSPADM

## 2023-07-25 RX ORDER — DIPHENHYDRAMINE HCL 25 MG
25 CAPSULE ORAL
Status: DISCONTINUED | OUTPATIENT
Start: 2023-07-25 | End: 2023-07-26 | Stop reason: HOSPADM

## 2023-07-25 RX ORDER — METHYLPREDNISOLONE SODIUM SUCCINATE 125 MG/2ML
125 INJECTION, POWDER, LYOPHILIZED, FOR SOLUTION INTRAMUSCULAR; INTRAVENOUS
Status: DISCONTINUED | OUTPATIENT
Start: 2023-07-25 | End: 2023-07-26 | Stop reason: HOSPADM

## 2023-07-25 RX ADMIN — METOPROLOL TARTRATE 10 MG: 5 INJECTION INTRAVENOUS at 10:24

## 2023-07-25 RX ADMIN — METOPROLOL TARTRATE 10 MG: 5 INJECTION INTRAVENOUS at 10:20

## 2023-07-25 RX ADMIN — NITROGLYCERIN 0.4 MG: 0.4 TABLET SUBLINGUAL at 10:49

## 2023-07-25 RX ADMIN — METOPROLOL TARTRATE 10 MG: 5 INJECTION INTRAVENOUS at 10:17

## 2023-07-25 RX ADMIN — METOPROLOL TARTRATE 100 MG: 50 TABLET, FILM COATED ORAL at 09:15

## 2023-07-25 RX ADMIN — IVABRADINE 15 MG: 5 TABLET, FILM COATED ORAL at 09:15

## 2023-07-25 RX ADMIN — IOPAMIDOL 120 ML: 755 INJECTION, SOLUTION INTRAVENOUS at 10:59

## 2023-07-25 RX ADMIN — METOPROLOL TARTRATE 10 MG: 5 INJECTION INTRAVENOUS at 10:28

## 2024-05-26 ENCOUNTER — HEALTH MAINTENANCE LETTER (OUTPATIENT)
Age: 39
End: 2024-05-26

## 2024-09-15 ENCOUNTER — HOSPITAL ENCOUNTER (EMERGENCY)
Facility: CLINIC | Age: 39
Discharge: HOME | End: 2024-09-16
Attending: EMERGENCY MEDICINE | Admitting: EMERGENCY MEDICINE
Payer: COMMERCIAL

## 2024-09-15 VITALS
TEMPERATURE: 98.6 F | SYSTOLIC BLOOD PRESSURE: 115 MMHG | BODY MASS INDEX: 33.27 KG/M2 | WEIGHT: 224.65 LBS | HEIGHT: 69 IN | HEART RATE: 88 BPM | RESPIRATION RATE: 16 BRPM | OXYGEN SATURATION: 98 % | DIASTOLIC BLOOD PRESSURE: 74 MMHG

## 2024-09-15 DIAGNOSIS — I49.3 FREQUENT PVCS: ICD-10-CM

## 2024-09-15 DIAGNOSIS — R00.2 PALPITATIONS: ICD-10-CM

## 2024-09-15 LAB
ANION GAP SERPL CALCULATED.3IONS-SCNC: 15 MMOL/L (ref 7–15)
BASOPHILS # BLD AUTO: 0 10E3/UL (ref 0–0.2)
BASOPHILS NFR BLD AUTO: 0 %
BUN SERPL-MCNC: 9.7 MG/DL (ref 6–20)
CALCIUM SERPL-MCNC: 9.4 MG/DL (ref 8.8–10.4)
CHLORIDE SERPL-SCNC: 104 MMOL/L (ref 98–107)
CREAT SERPL-MCNC: 0.79 MG/DL (ref 0.67–1.17)
EGFRCR SERPLBLD CKD-EPI 2021: >90 ML/MIN/1.73M2
EOSINOPHIL # BLD AUTO: 0.3 10E3/UL (ref 0–0.7)
EOSINOPHIL NFR BLD AUTO: 4 %
ERYTHROCYTE [DISTWIDTH] IN BLOOD BY AUTOMATED COUNT: 11.9 % (ref 10–15)
GLUCOSE SERPL-MCNC: 91 MG/DL (ref 70–99)
HCO3 SERPL-SCNC: 21 MMOL/L (ref 22–29)
HCT VFR BLD AUTO: 42.7 % (ref 40–53)
HGB BLD-MCNC: 14.5 G/DL (ref 13.3–17.7)
HOLD SPECIMEN: NORMAL
HOLD SPECIMEN: NORMAL
IMM GRANULOCYTES # BLD: 0 10E3/UL
IMM GRANULOCYTES NFR BLD: 0 %
LYMPHOCYTES # BLD AUTO: 1.4 10E3/UL (ref 0.8–5.3)
LYMPHOCYTES NFR BLD AUTO: 20 %
MAGNESIUM SERPL-MCNC: 1.8 MG/DL (ref 1.7–2.3)
MCH RBC QN AUTO: 29.1 PG (ref 26.5–33)
MCHC RBC AUTO-ENTMCNC: 34 G/DL (ref 31.5–36.5)
MCV RBC AUTO: 86 FL (ref 78–100)
MONOCYTES # BLD AUTO: 0.7 10E3/UL (ref 0–1.3)
MONOCYTES NFR BLD AUTO: 10 %
NEUTROPHILS # BLD AUTO: 4.4 10E3/UL (ref 1.6–8.3)
NEUTROPHILS NFR BLD AUTO: 65 %
NRBC # BLD AUTO: 0 10E3/UL
NRBC BLD AUTO-RTO: 0 /100
NT-PROBNP SERPL-MCNC: 152 PG/ML (ref 0–450)
PLATELET # BLD AUTO: 261 10E3/UL (ref 150–450)
POTASSIUM SERPL-SCNC: 3.9 MMOL/L (ref 3.4–5.3)
RBC # BLD AUTO: 4.98 10E6/UL (ref 4.4–5.9)
SODIUM SERPL-SCNC: 140 MMOL/L (ref 135–145)
TROPONIN T SERPL HS-MCNC: <6 NG/L
TSH SERPL DL<=0.005 MIU/L-ACNC: 1.5 UIU/ML (ref 0.3–4.2)
WBC # BLD AUTO: 6.8 10E3/UL (ref 4–11)

## 2024-09-15 PROCEDURE — 80048 BASIC METABOLIC PNL TOTAL CA: CPT | Performed by: EMERGENCY MEDICINE

## 2024-09-15 PROCEDURE — 83735 ASSAY OF MAGNESIUM: CPT | Performed by: EMERGENCY MEDICINE

## 2024-09-15 PROCEDURE — 93005 ELECTROCARDIOGRAM TRACING: CPT

## 2024-09-15 PROCEDURE — 85025 COMPLETE CBC W/AUTO DIFF WBC: CPT | Performed by: EMERGENCY MEDICINE

## 2024-09-15 PROCEDURE — 36415 COLL VENOUS BLD VENIPUNCTURE: CPT | Performed by: EMERGENCY MEDICINE

## 2024-09-15 PROCEDURE — 99284 EMERGENCY DEPT VISIT MOD MDM: CPT

## 2024-09-15 PROCEDURE — 84443 ASSAY THYROID STIM HORMONE: CPT | Performed by: EMERGENCY MEDICINE

## 2024-09-15 PROCEDURE — 83880 ASSAY OF NATRIURETIC PEPTIDE: CPT | Performed by: EMERGENCY MEDICINE

## 2024-09-15 PROCEDURE — 84484 ASSAY OF TROPONIN QUANT: CPT | Performed by: EMERGENCY MEDICINE

## 2024-09-15 ASSESSMENT — COLUMBIA-SUICIDE SEVERITY RATING SCALE - C-SSRS
6. HAVE YOU EVER DONE ANYTHING, STARTED TO DO ANYTHING, OR PREPARED TO DO ANYTHING TO END YOUR LIFE?: NO
1. IN THE PAST MONTH, HAVE YOU WISHED YOU WERE DEAD OR WISHED YOU COULD GO TO SLEEP AND NOT WAKE UP?: NO
2. HAVE YOU ACTUALLY HAD ANY THOUGHTS OF KILLING YOURSELF IN THE PAST MONTH?: NO

## 2024-09-15 ASSESSMENT — ACTIVITIES OF DAILY LIVING (ADL)
ADLS_ACUITY_SCORE: 35
ADLS_ACUITY_SCORE: 35

## 2024-09-16 LAB
ATRIAL RATE - MUSE: 106 BPM
DIASTOLIC BLOOD PRESSURE - MUSE: NORMAL MMHG
INTERPRETATION ECG - MUSE: NORMAL
P AXIS - MUSE: 60 DEGREES
PR INTERVAL - MUSE: 140 MS
QRS DURATION - MUSE: 84 MS
QT - MUSE: 342 MS
QTC - MUSE: 454 MS
R AXIS - MUSE: 35 DEGREES
SYSTOLIC BLOOD PRESSURE - MUSE: NORMAL MMHG
T AXIS - MUSE: 54 DEGREES
VENTRICULAR RATE- MUSE: 106 BPM

## 2024-09-16 NOTE — ED PROVIDER NOTES
"  Emergency Department Note      History of Present Illness     Chief Complaint   Palpitations    HPI   Brent Brock is a 39 year old male with history of SVT who presents to the ED for evaluation of palpitations.  The patient states that he was playing outside with his child earlier this evening when he started to feel extra heartbeats.  He states the sensation was not similar to prior episodes of SVT and his heart was not going fast, it was just beating irregularly.  He has multiple children at home and has slept about 6 hours in the last 3 days.  No recent new medications or medication changes.  He does not drink caffeine or alcohol and denies any other illicit substance use.  Denies lightheadedness, syncope, or shortness of breath.    Independent Historian   None    Review of External Notes   Echocardiogram from 5/3/2023 reviewed which showed and LVEF of 30-35%.  Patient had a Zio patch placed in June 2023, it appears it fell off prior to completion of the protocol.    Past Medical History     Medical History and Problem List   ADHD  Diverticulitis  Paroxysmal supraventricular tachycardia   Migraine  Cellulitis   Sepsis  Tobacco use     Medications   The patient is not currently taking any prescribed medications.     Surgical History   Eyelid biopsy  Sigmoidectomy     Physical Exam     Patient Vitals for the past 24 hrs:   BP Temp Temp src Pulse Resp SpO2 Height Weight   09/15/24 2230 115/74 -- -- 88 -- 98 % -- --   09/15/24 2136 (!) 184/110 98.6  F (37  C) Temporal 110 16 100 % 1.753 m (5' 9\") 101.9 kg (224 lb 10.4 oz)     Physical Exam  General: Laying on the ED bed  HEENT: Normocephalic, atraumatic  Cardiac: Warm and well perfused, regularly irregular rhythm with ectopy every third beat on auscultation  Pulm: Breathing comfortably, no accessory muscle usage, no conversational dyspnea, and lungs clear bilaterally  MSK: No bony deformities, no edema BLE, no calf tenderness BLE  Skin: Warm and " dry  Neuro: Moves all extremities  Psych: Pleasant mood and affect      Diagnostics     Lab Results   Labs Ordered and Resulted from Time of ED Arrival to Time of ED Departure   BASIC METABOLIC PANEL - Abnormal       Result Value    Sodium 140      Potassium 3.9      Chloride 104      Carbon Dioxide (CO2) 21 (*)     Anion Gap 15      Urea Nitrogen 9.7      Creatinine 0.79      GFR Estimate >90      Calcium 9.4      Glucose 91     TROPONIN T, HIGH SENSITIVITY - Normal    Troponin T, High Sensitivity <6     NT PROBNP INPATIENT - Normal    N terminal Pro BNP Inpatient 152     TSH WITH FREE T4 REFLEX - Normal    TSH 1.50     MAGNESIUM - Normal    Magnesium 1.8     CBC WITH PLATELETS AND DIFFERENTIAL    WBC Count 6.8      RBC Count 4.98      Hemoglobin 14.5      Hematocrit 42.7      MCV 86      MCH 29.1      MCHC 34.0      RDW 11.9      Platelet Count 261      % Neutrophils 65      % Lymphocytes 20      % Monocytes 10      % Eosinophils 4      % Basophils 0      % Immature Granulocytes 0      NRBCs per 100 WBC 0      Absolute Neutrophils 4.4      Absolute Lymphocytes 1.4      Absolute Monocytes 0.7      Absolute Eosinophils 0.3      Absolute Basophils 0.0      Absolute Immature Granulocytes 0.0      Absolute NRBCs 0.0         EKG   ECG taken at 2139, ECG read at 2202  Sinus tachycardia with frequent PVCs  Increased PVC burden as compared to prior, dated 4/21/23.  Rate 106 bpm. [place] interval 140 ms. QRS duration 84 ms. QT/QTc 342/454 ms. P-R-T axes 60 35 54.    Independent Interpretation   None    ED Course      Medications Administered   Medications - No data to display    Procedures   Procedures     Discussion of Management   None    ED Course        Additional Documentation  None    Medical Decision Making / Diagnosis     CMS Diagnoses: None    MIPS       None    MDM   Brent Brock is a 39 year old male who presents with palpitations.  This is in the context of history of SVT and significant LV dysfunction on  echocardiogram.  The patient had been undergoing workup with cardiology and appears was lost to follow-up.  Rhythm here today is consistent with trigeminy on auscultation, appears was more consistent with a PVC every fifth beat on the EKG.  Lab work is overall quite reassuring.  Suspect that the patient's increase stress from lack of sleep is contributory today.  Plan is to discharge home with recommendation for cardiology follow-up, placing a fresh referral to facilitate that process and recommended the patient also call the cardiology office.  Also encouraged patient to sleep well as often as possible as this seems to be one of the main contributing stressors today.  He expressed understanding of and agreement with the plan.    Disposition   The patient was discharged.     Diagnosis     ICD-10-CM    1. Palpitations  R00.2 Adult Cardiology Eval  Referral      2. Frequent PVCs  I49.3 Adult Cardiology Eval  Referral           Scribe Disclosure:  Brigitte CARRIZALES, am serving as a scribe at 9:58 PM on 9/15/2024 to document services personally performed by Bishop Muniz MD based on my observations and the provider's statements to me.        Bishop Muniz MD  09/16/24 0019

## 2024-09-16 NOTE — ED TRIAGE NOTES
Today at 1300 pt reports his heart beat was irregular. Pt denies shortness of breath. Pt reports chest pain that comes and goes

## 2024-09-17 ENCOUNTER — ORDERS ONLY (AUTO-RELEASED) (OUTPATIENT)
Dept: CARDIOLOGY | Facility: CLINIC | Age: 39
End: 2024-09-17
Payer: COMMERCIAL

## 2024-09-17 ENCOUNTER — TELEPHONE (OUTPATIENT)
Dept: CARDIOLOGY | Facility: CLINIC | Age: 39
End: 2024-09-17
Payer: COMMERCIAL

## 2024-09-17 DIAGNOSIS — R00.2 PALPITATIONS: Primary | ICD-10-CM

## 2024-09-17 DIAGNOSIS — R00.2 PALPITATIONS: ICD-10-CM

## 2024-09-17 DIAGNOSIS — I47.10 PAROXYSMAL SUPRAVENTRICULAR TACHYCARDIA (H): ICD-10-CM

## 2024-09-17 DIAGNOSIS — I49.3 PVC'S (PREMATURE VENTRICULAR CONTRACTIONS): ICD-10-CM

## 2024-09-17 NOTE — TELEPHONE ENCOUNTER
Spoke to patient to review recommendations per Dr Giles:  Yes, I would recommend doing a 1 week Ziopatch monitor.  He did not have any significant PVC's or SVT on his monitor from last year.  I am also not convinced that his cardiomyopathy is due to an arrhythmia so I would recommend following up with cardiology in the meantime for further evaluation.     Verbal clarification per Dr Giles:  --patient to see general cardiology for evaluation of cardiomyopathy     Orders placed for monitor to evaluate PVC/SVT and follow up to review results.     ZP to be mailed out to patient.  Sent to scheduling to get follow up made for EP to review monitor results.  Also patient needs follow up with general cardiology to be evaluated for his cardiomyopathy.    Patient agreed with plan and need for appointments.  DONN Castillo

## 2024-09-17 NOTE — TELEPHONE ENCOUNTER
This encounter is being sent to inform the clinic that this patient has a referral from Bishop Muniz MD  for the diagnoses of Palpitations [R00.2]; Frequent PVCs [I49.3]  and has requested that this patient be seen within 1-2 weeks and/or with Heart Failure.  Based on the availability of our provider(s), we are unable to accommodate this request.    Were all sites offered this patient?  Yes    Does scheduling algorithm request to schedule next available?  Patient appointment has not been scheduled.  Please review the referral request for accommodation and contact the patient.  If unable to accommodate, please resubmit a referral and indicate a preferred partner or affiliate location using Provider Finder or Scheduling Instructions field.     Per above referral, patient to see Heart Failure but the diagnoses are not related to HF. Please review and reach out to patient to schedule with the appropriate provider.

## 2024-09-17 NOTE — TELEPHONE ENCOUNTER
Reviewed with Dr Giles see below recommendations:  Yes, I would recommend doing a 1 week Ziopatch monitor.  He did not have any significant PVC's or SVT on his monitor from last year.  I am also not convinced that his cardiomyopathy is due to an arrhythmia so I would recommend following up with cardiology in the meantime for further evaluation.    Verbal clarification per Dr Giles:  --patient to see general cardiology for evaluation of cardiomyopathy    Orders placed for monitor to evaluate PVC/SVT and follow up to review results.

## 2024-10-21 ENCOUNTER — OFFICE VISIT (OUTPATIENT)
Dept: CARDIOLOGY | Facility: CLINIC | Age: 39
End: 2024-10-21
Payer: COMMERCIAL

## 2024-10-21 VITALS
SYSTOLIC BLOOD PRESSURE: 134 MMHG | DIASTOLIC BLOOD PRESSURE: 90 MMHG | HEIGHT: 69 IN | BODY MASS INDEX: 33.77 KG/M2 | HEART RATE: 92 BPM | WEIGHT: 228 LBS

## 2024-10-21 DIAGNOSIS — R00.2 PALPITATIONS: ICD-10-CM

## 2024-10-21 DIAGNOSIS — I49.3 PVC'S (PREMATURE VENTRICULAR CONTRACTIONS): ICD-10-CM

## 2024-10-21 DIAGNOSIS — I47.10 PAROXYSMAL SUPRAVENTRICULAR TACHYCARDIA (H): ICD-10-CM

## 2024-10-21 DIAGNOSIS — I42.9 SECONDARY CARDIOMYOPATHY (H): ICD-10-CM

## 2024-10-21 DIAGNOSIS — I10 BENIGN ESSENTIAL HYPERTENSION: Primary | ICD-10-CM

## 2024-10-21 PROCEDURE — 99214 OFFICE O/P EST MOD 30 MIN: CPT | Performed by: INTERNAL MEDICINE

## 2024-10-21 PROCEDURE — G2211 COMPLEX E/M VISIT ADD ON: HCPCS | Performed by: INTERNAL MEDICINE

## 2024-10-21 RX ORDER — LISINOPRIL 10 MG/1
10 TABLET ORAL DAILY
Qty: 30 TABLET | Refills: 3 | Status: SHIPPED | OUTPATIENT
Start: 2024-10-21 | End: 2024-10-30

## 2024-10-21 NOTE — PROGRESS NOTES
EP Cardiology Clinic Progress Note  Brent Brock MRN# [id number]   YOB: 1985 Age: 39 year old   Primary Cardiologist: Dr. Giles  Reason for visit: EP follow-up             Assessment and Plan:   Brent Brock is a very pleasant 39 year old male who is here today for EP follow-up.      1.  Symptomatic the low burden PVCs, noted during ED visit 9/2024 and on follow-up Zio patch monitor (ordered less than 1%).  2.  SVT with the ED presentation 4/2023 that terminated with Valsalva.  No subjective recurrence.  3.  Nonischemic cardiomyopathy, LVEF 30 to 35% on echo 5/24/2023.  No GDMT was initiated. CT coronary angiogram 7/2023 with normal coronary arteries and no detectable stenoses or plaque.  Ziopatch monitor without arrhythmia. It appears patient was then lost to follow-up. Recommend repeat echocardiogram -will try to obtain this prior to patient's upcoming visit with Dr. Ramirez.   4.  Hypertension, uncontrolled.  Start lisinopril 10 mg once daily.  5.  Alcohol use disorder, with complete cessation 1 month ago. Prior to that was consuming a minimum of 6 beers nightly for quite some time. Congratulated patient and encouraged ongoing abstinence.  6.  Tobacco use disorder, cessation advised.  Used to smoke, quit 1.5 years ago.  Now chews tobacco.    Changes today: Start lisinopril 10 mg once daily.     I am meeting Brent for the first time today.  Overall, he is feeling stable from a cardiac perspective.  Denies any symptoms concerning for angina or decompensated heart failure.  He has a history of  SVT that was terminated in the emergency department with use of Valsalva.  Most recently, he was seen in the ED for symptomatic PVCs.  Follow-up Zio patch monitor revealed low burden PVCs, less than 1%, although symptomatic.  It is likely his symptomatic PVCs were triggered by abrupt alcohol and caffeine cessation.  He is no longer experiencing palpitations.    We discussed his history of  cardiomyopathy with LVEF 30 to 35% noted on echocardiogram 5/2023.  Fortunately, CT coronary angiogram at that time revealed normal coronary arteries without detectable stenosis or plaque.  Zio patch was without arrhythmia.  Recommend repeating echocardiogram for LVEF evaluation. I will try to see if we can get this completed prior to his apt with Dr. Ramirez next week.  Is possible his past cardiomyopathy was alcohol-induced versus other (never had cMRI). He has now discontinued alcohol use and remained abstinent for the last 1 month.  I congratulated him on cessation and encouraged ongoing abstinence.  Should his LVEF remain low, aggressive GDMT initiation/uptitration will be necessary (would recommend CORE enrollment).  In order to control his blood pressure, I started lisinopril 10 mg once daily today.  He will have a BMP at the time of his follow-up with Dr. Ramirez next week.    Elena Frye PA-C  [facility]  Pager: [phone]          History of Presenting Illness:    Brent Brock is a very pleasant 39 year old male with a history of diverticulitis, paroxysmal SVT, and nonischemic cardiomyopathy.    Patient established care with EP, Dr. Dinh, 5/2023 for evaluation of SVT.  In April of that year, patient had presented to the ED for evaluation of palpitations.  ECG confirmed SVT which terminated with Valsalva maneuvers.  Options for management were discussed including watch and wait, pharmacotherapy, or catheter ablation.  Patient preferred to think about his options prior to proceeding.  An echocardiogram was completed 5/24/2023 that revealed mildly dilated LV, LVEF 30 to 35% with severe global hypokinesia.  RV was normal size with mildly decreased RV systolic function.  No hemodynamically significant valvular disease.  Zio patch monitor was recommended for rhythm evaluation as well as CTA to rule out CAD.  Zio patch monitor revealed rare PACs/PVCs.  CT coronary angiogram  "7/25/2023 revealed normal coronary anatomy with no detectable stenosis or plaque. It appears he was then lost to follow up.     Patient was in the ED 9/15/2024 with palpitations. ECG there revealed ST with frequent PVCs. He was discharged the same day and advised to follow-up with cardiology.     Follow-up 7-day Zio patch monitor was completed 9/2024 and revealed normal sinus rhythm with an average heart rate of 79 bpm.  There was 1 episode of SVT lasting 8 beats in duration at a rate of 130 bpm.  Symptomatic triggers associated with rare PVCs (less than 1%).    I am meeting Brent today for the first time. He reports feeling much improved since his ED visit. He reports at that time of his ED visit, he had just quit caffeine and alcohol \"cold turkey\" and was experiencing palpitations, different than when he presented to ED for SVT.  These are described as regular beats, followed by a pause, then a double beat. He continued to experience these palpitations for 1-2 weeks after his ED visit, but they have now subsided.  He had a few episodes of palpitations while wearing the Zio patch monitor but has not had any palpitations for a few weeks now.     Denies episodes of chest pain, shortness of breath, lightheadedness, dizziness, near-syncope or syncope.  No orthopnea or PND.  No LE edema.    He continues to remain abstinent from alcohol.  Prior to 1 month ago, he was consuming a minimum of 6 beers per night.  He was also consuming 3 cups of coffee daily.  He is now consuming 1 cup of coffee daily.  Quit smoking cigarettes 1.5 years ago, started at age 16.  He now chews tobacco.  No routine exercise.  Has 2 kids, 2.5 years old and 2 months old.  Lives with his in-laws, caring for them as well.  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      Family history significant for hypertension in his mother.  No history of cardiovascular disease in his mother, father, or grandparents.      " "Social History       Social History     Socioeconomic History    Marital status:      Spouse name: Not on file    Number of children: Not on file    Years of education: Not on file    Highest education level: Not on file   Occupational History    Not on file   Tobacco Use    Smoking status: Every Day     Types: Cigarettes    Smokeless tobacco: Current     Types: Chew    Tobacco comments:     Very rare cigarette but Chews everyday   Substance and Sexual Activity    Alcohol use: Yes     Comment: 16 beers per week    Drug use: No    Sexual activity: Yes     Partners: Female     Birth control/protection: Condom   Other Topics Concern    Not on file   Social History Narrative    Not on file     Social Determinants of Health     Financial Resource Strain: Not on file   Food Insecurity: Not on file   Transportation Needs: Not on file   Physical Activity: Not on file   Stress: Not on file   Social Connections: Not on file   Interpersonal Safety: Not on file   Housing Stability: Not on file            Review of Systems:   Please see HPI         Physical Exam:   Vitals: BP (!) 134/90 (BP Location: Right arm, Patient Position: Sitting, Cuff Size: Adult Large)   Pulse 92   Ht 1.753 m (5' 9\")   Wt 103.4 kg (228 lb)   BMI 33.67 kg/m     Wt Readings from Last 4 Encounters:   09/15/24 101.9 kg (224 lb 10.4 oz)   05/03/23 97.1 kg (214 lb)   02/13/22 99 kg (218 lb 4.8 oz)   05/18/09 78.9 kg (174 lb)     GEN: well nourished, in no acute distress.  HEENT:  Pupils equal, round. Sclerae nonicteric.   NECK: Supple, no masses appreciated. No JVD  C/V:  Regular rate and rhythm, no murmur, rub or gallop.  RESP: Respirations are unlabored. Clear to auscultation bilaterally without wheezing, rales, or rhonchi.  GI: Abdomen soft, nontender.  EXTREM: no LE edema.  NEURO: Alert and oriented, cooperative.  SKIN: Warm and dry.        Data:   LIPID RESULTS:  No results found for: \"CHOL\", \"HDL\", \"LDL\", \"TRIG\", \"CHOLHDLRATIO\"  LIVER ENZYME " "RESULTS:  No results found for: \"AST\", \"ALT\"  CBC RESULTS:  Lab Results   Component Value Date    WBC 6.8 09/15/2024    RBC 4.98 09/15/2024    HGB 14.5 09/15/2024    HCT 42.7 09/15/2024    MCV 86 09/15/2024    MCH 29.1 09/15/2024    MCHC 34.0 09/15/2024    RDW 11.9 09/15/2024     09/15/2024     BMP RESULTS:  Lab Results   Component Value Date     09/15/2024    POTASSIUM 3.9 09/15/2024    POTASSIUM 3.6 02/15/2022    POTASSIUM 4.2 01/20/2014    CHLORIDE 104 09/15/2024    CHLORIDE 107 02/14/2022    CO2 21 (L) 09/15/2024    CO2 28 02/14/2022    ANIONGAP 15 09/15/2024    ANIONGAP 4 02/14/2022    GLC 91 09/15/2024    GLC 88 02/14/2022    GLC 99 01/20/2014    BUN 9.7 09/15/2024    BUN 12 02/14/2022    CR 0.79 09/15/2024    GFRESTIMATED >90 09/15/2024    GFRESTIMATED >60 01/20/2014    GFRESTBLACK >60 01/20/2014    AILYN 9.4 09/15/2024      A1C RESULTS:  No results found for: \"A1C\"  INR RESULTS:  No results found for: \"INR\"         Medications     Current Outpatient Medications   Medication Sig Dispense Refill    Coenzyme Q10-Vitamin E (QUNOL ULTRA COQ10 PO)       MAGNESIUM PO             Past Medical History     Past Medical History:   Diagnosis Date    ADHD (attention deficit hyperactivity disorder)     Diverticulitis of colon     Paroxysmal supraventricular tachycardia      Past Surgical History:   Procedure Laterality Date    HC BIOPSY OF EYELID      Benign growth    SIGMOIDECTOMY       Family History   Problem Relation Age of Onset    Hypertension Mother     Depression Sister     Diabetes Mother         borderline            Allergies   No known drug allergy    The longitudinal plan of care for the diagnosis(es)/condition(s) as documented were addressed during this visit. Due to the added complexity in care, I will continue to support Brent in the subsequent management and with ongoing continuity of care.     35 minutes spent on the date of the encounter doing chart review, history and exam, documentation and " further activities as noted above    Elena Frye PA-C  [facility]  Pager: [phone]

## 2024-10-21 NOTE — PATIENT INSTRUCTIONS
Call the nurse for any questions or concerns at [phone].     Plan:  1. Medication changes:     Start lisinopril 10 mg once daily    2. Echocardiogram in the near future    3. BMP at time of apt next week   -Scheduling phone number: [phone]    It was great seeing you today!    Elena Frye PA-C  Physician Assistant  [facility]

## 2024-10-21 NOTE — LETTER
10/21/2024    Physician No Ref-Primary  No address on file    RE: Brent Brock       Dear Colleague,     I had the pleasure of seeing Brent Brock in the [facility].  EP Cardiology Clinic Progress Note  Brent Brock MRN# [id number]   YOB: 1985 Age: 39 year old   Primary Cardiologist: Dr. Giles  Reason for visit: EP follow-up             Assessment and Plan:   Brent Brock is a very pleasant 39 year old male who is here today for EP follow-up.      1.  Symptomatic the low burden PVCs, noted during ED visit 9/2024 and on follow-up Zio patch monitor (ordered less than 1%).  2.  SVT with the ED presentation 4/2023 that terminated with Valsalva.  No subjective recurrence.  3.  Nonischemic cardiomyopathy, LVEF 30 to 35% on echo 5/24/2023.  No GDMT was initiated. CT coronary angiogram 7/2023 with normal coronary arteries and no detectable stenoses or plaque.  Ziopatch monitor without arrhythmia. It appears patient was then lost to follow-up. Recommend repeat echocardiogram -will try to obtain this prior to patient's upcoming visit with Dr. Ramirez.   4.  Hypertension, uncontrolled.  Start lisinopril 10 mg once daily.  5.  Alcohol use disorder, with complete cessation 1 month ago. Prior to that was consuming a minimum of 6 beers nightly for quite some time. Congratulated patient and encouraged ongoing abstinence.  6.  Tobacco use disorder, cessation advised.  Used to smoke, quit 1.5 years ago.  Now chews tobacco.    Changes today: Start lisinopril 10 mg once daily.     I am meeting Brent for the first time today.  Overall, he is feeling stable from a cardiac perspective.  Denies any symptoms concerning for angina or decompensated heart failure.  He has a history of  SVT that was terminated in the emergency department with use of Valsalva.  Most recently, he was seen in the ED for symptomatic PVCs.  Follow-up Zio patch monitor revealed low burden PVCs, less than  1%, although symptomatic.  It is likely his symptomatic PVCs were triggered by abrupt alcohol and caffeine cessation.  He is no longer experiencing palpitations.    We discussed his history of cardiomyopathy with LVEF 30 to 35% noted on echocardiogram 5/2023.  Fortunately, CT coronary angiogram at that time revealed normal coronary arteries without detectable stenosis or plaque.  Zio patch was without arrhythmia.  Recommend repeating echocardiogram for LVEF evaluation. I will try to see if we can get this completed prior to his apt with Dr. Ramirez next week.  Is possible his past cardiomyopathy was alcohol-induced versus other (never had cMRI). He has now discontinued alcohol use and remained abstinent for the last 1 month.  I congratulated him on cessation and encouraged ongoing abstinence.  Should his LVEF remain low, aggressive GDMT initiation/uptitration will be necessary (would recommend CORE enrollment).  In order to control his blood pressure, I started lisinopril 10 mg once daily today.  He will have a BMP at the time of his follow-up with Dr. Ramirez next week.    Elena Frye PA-C  [facility]  Pager: [phone]          History of Presenting Illness:    Brent Brock is a very pleasant 39 year old male with a history of diverticulitis, paroxysmal SVT, and nonischemic cardiomyopathy.    Patient established care with EP, Dr. Dinh, 5/2023 for evaluation of SVT.  In April of that year, patient had presented to the ED for evaluation of palpitations.  ECG confirmed SVT which terminated with Valsalva maneuvers.  Options for management were discussed including watch and wait, pharmacotherapy, or catheter ablation.  Patient preferred to think about his options prior to proceeding.  An echocardiogram was completed 5/24/2023 that revealed mildly dilated LV, LVEF 30 to 35% with severe global hypokinesia.  RV was normal size with mildly decreased RV systolic function.  No  "hemodynamically significant valvular disease.  Zio patch monitor was recommended for rhythm evaluation as well as CTA to rule out CAD.  Zio patch monitor revealed rare PACs/PVCs.  CT coronary angiogram 7/25/2023 revealed normal coronary anatomy with no detectable stenosis or plaque. It appears he was then lost to follow up.     Patient was in the ED 9/15/2024 with palpitations. ECG there revealed ST with frequent PVCs. He was discharged the same day and advised to follow-up with cardiology.     Follow-up 7-day Zio patch monitor was completed 9/2024 and revealed normal sinus rhythm with an average heart rate of 79 bpm.  There was 1 episode of SVT lasting 8 beats in duration at a rate of 130 bpm.  Symptomatic triggers associated with rare PVCs (less than 1%).    I am meeting Brent today for the first time. He reports feeling much improved since his ED visit. He reports at that time of his ED visit, he had just quit caffeine and alcohol \"cold turkey\" and was experiencing palpitations, different than when he presented to ED for SVT.  These are described as regular beats, followed by a pause, then a double beat. He continued to experience these palpitations for 1-2 weeks after his ED visit, but they have now subsided.  He had a few episodes of palpitations while wearing the Zio patch monitor but has not had any palpitations for a few weeks now.     Denies episodes of chest pain, shortness of breath, lightheadedness, dizziness, near-syncope or syncope.  No orthopnea or PND.  No LE edema.    He continues to remain abstinent from alcohol.  Prior to 1 month ago, he was consuming a minimum of 6 beers per night.  He was also consuming 3 cups of coffee daily.  He is now consuming 1 cup of coffee daily.  Quit smoking cigarettes 1.5 years ago, started at age 16.  He now chews tobacco.  No routine exercise.  Has 2 kids, 2.5 years old and 2 months old.  Lives with his in-laws, caring for them as well.  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      Family history significant for hypertension in his mother.  No history of cardiovascular disease in his mother, father, or grandparents.      Social History       Social History     Socioeconomic History     Marital status:      Spouse name: Not on file     Number of children: Not on file     Years of education: Not on file     Highest education level: Not on file   Occupational History     Not on file   Tobacco Use     Smoking status: Every Day     Types: Cigarettes     Smokeless tobacco: Current     Types: Chew     Tobacco comments:     Very rare cigarette but Chews everyday   Substance and Sexual Activity     Alcohol use: Yes     Comment: 16 beers per week     Drug use: No     Sexual activity: Yes     Partners: Female     Birth control/protection: Condom   Other Topics Concern     Not on file   Social History Narrative     Not on file     Social Determinants of Health     Financial Resource Strain: Not on file   Food Insecurity: Not on file   Transportation Needs: Not on file   Physical Activity: Not on file   Stress: Not on file   Social Connections: Not on file   Interpersonal Safety: Not on file   Housing Stability: Not on file            Review of Systems:   Please see HPI         Physical Exam:   Vitals: BP (!) 134/90 (BP Location: Right arm, Patient Position: Sitting, Cuff Size: Adult Large)   Pulse 92   Ht 1.753 m (5' 9\")   Wt 103.4 kg (228 lb)   BMI 33.67 kg/m     Wt Readings from Last 4 Encounters:   09/15/24 101.9 kg (224 lb 10.4 oz)   05/03/23 97.1 kg (214 lb)   02/13/22 99 kg (218 lb 4.8 oz)   05/18/09 78.9 kg (174 lb)     GEN: well nourished, in no acute distress.  HEENT:  Pupils equal, round. Sclerae nonicteric.   NECK: Supple, no masses appreciated. No JVD  C/V:  Regular rate and rhythm, no murmur, rub or gallop.  RESP: Respirations are unlabored. Clear to auscultation bilaterally without wheezing, rales, or rhonchi.  GI: " "Abdomen soft, nontender.  EXTREM: no LE edema.  NEURO: Alert and oriented, cooperative.  SKIN: Warm and dry.        Data:   LIPID RESULTS:  No results found for: \"CHOL\", \"HDL\", \"LDL\", \"TRIG\", \"CHOLHDLRATIO\"  LIVER ENZYME RESULTS:  No results found for: \"AST\", \"ALT\"  CBC RESULTS:  Lab Results   Component Value Date    WBC 6.8 09/15/2024    RBC 4.98 09/15/2024    HGB 14.5 09/15/2024    HCT 42.7 09/15/2024    MCV 86 09/15/2024    MCH 29.1 09/15/2024    MCHC 34.0 09/15/2024    RDW 11.9 09/15/2024     09/15/2024     BMP RESULTS:  Lab Results   Component Value Date     09/15/2024    POTASSIUM 3.9 09/15/2024    POTASSIUM 3.6 02/15/2022    POTASSIUM 4.2 01/20/2014    CHLORIDE 104 09/15/2024    CHLORIDE 107 02/14/2022    CO2 21 (L) 09/15/2024    CO2 28 02/14/2022    ANIONGAP 15 09/15/2024    ANIONGAP 4 02/14/2022    GLC 91 09/15/2024    GLC 88 02/14/2022    GLC 99 01/20/2014    BUN 9.7 09/15/2024    BUN 12 02/14/2022    CR 0.79 09/15/2024    GFRESTIMATED >90 09/15/2024    GFRESTIMATED >60 01/20/2014    GFRESTBLACK >60 01/20/2014    AILYN 9.4 09/15/2024      A1C RESULTS:  No results found for: \"A1C\"  INR RESULTS:  No results found for: \"INR\"         Medications     Current Outpatient Medications   Medication Sig Dispense Refill     Coenzyme Q10-Vitamin E (QUNOL ULTRA COQ10 PO)        MAGNESIUM PO             Past Medical History     Past Medical History:   Diagnosis Date     ADHD (attention deficit hyperactivity disorder)      Diverticulitis of colon      Paroxysmal supraventricular tachycardia      Past Surgical History:   Procedure Laterality Date     HC BIOPSY OF EYELID      Benign growth     SIGMOIDECTOMY       Family History   Problem Relation Age of Onset     Hypertension Mother      Depression Sister      Diabetes Mother         borderline            Allergies   No known drug allergy    The longitudinal plan of care for the diagnosis(es)/condition(s) as documented were addressed during this visit. Due to the " added complexity in care, I will continue to support Brent in the subsequent management and with ongoing continuity of care.     35 minutes spent on the date of the encounter doing chart review, history and exam, documentation and further activities as noted above    JD Walker [facility]  Pager: [phone]      Thank you for allowing me to participate in the care of your patient.      Sincerely,     JD Walker [facility]  cc:   Manjinder Giles MD  [address] [place] [address]

## 2024-10-23 ENCOUNTER — LAB (OUTPATIENT)
Dept: LAB | Facility: CLINIC | Age: 39
End: 2024-10-23
Payer: COMMERCIAL

## 2024-10-23 DIAGNOSIS — I10 BENIGN ESSENTIAL HYPERTENSION: ICD-10-CM

## 2024-10-23 LAB
ANION GAP SERPL CALCULATED.3IONS-SCNC: 10 MMOL/L (ref 7–15)
BUN SERPL-MCNC: 13.2 MG/DL (ref 6–20)
CALCIUM SERPL-MCNC: 9.5 MG/DL (ref 8.8–10.4)
CHLORIDE SERPL-SCNC: 107 MMOL/L (ref 98–107)
CREAT SERPL-MCNC: 0.8 MG/DL (ref 0.67–1.17)
EGFRCR SERPLBLD CKD-EPI 2021: >90 ML/MIN/1.73M2
GLUCOSE SERPL-MCNC: 116 MG/DL (ref 70–99)
HCO3 SERPL-SCNC: 23 MMOL/L (ref 22–29)
POTASSIUM SERPL-SCNC: 4.1 MMOL/L (ref 3.4–5.3)
SODIUM SERPL-SCNC: 140 MMOL/L (ref 135–145)

## 2024-10-23 PROCEDURE — 80048 BASIC METABOLIC PNL TOTAL CA: CPT | Performed by: INTERNAL MEDICINE

## 2024-10-23 PROCEDURE — 36415 COLL VENOUS BLD VENIPUNCTURE: CPT | Performed by: INTERNAL MEDICINE

## 2024-10-30 ENCOUNTER — OFFICE VISIT (OUTPATIENT)
Dept: CARDIOLOGY | Facility: CLINIC | Age: 39
End: 2024-10-30
Payer: COMMERCIAL

## 2024-10-30 VITALS
HEART RATE: 80 BPM | BODY MASS INDEX: 34.18 KG/M2 | WEIGHT: 230.8 LBS | OXYGEN SATURATION: 98 % | SYSTOLIC BLOOD PRESSURE: 130 MMHG | HEIGHT: 69 IN | DIASTOLIC BLOOD PRESSURE: 88 MMHG

## 2024-10-30 DIAGNOSIS — I49.3 FREQUENT PVCS: ICD-10-CM

## 2024-10-30 DIAGNOSIS — R00.2 PALPITATIONS: ICD-10-CM

## 2024-10-30 DIAGNOSIS — E66.09 CLASS 1 OBESITY DUE TO EXCESS CALORIES WITHOUT SERIOUS COMORBIDITY WITH BODY MASS INDEX (BMI) OF 34.0 TO 34.9 IN ADULT: ICD-10-CM

## 2024-10-30 DIAGNOSIS — E66.811 CLASS 1 OBESITY DUE TO EXCESS CALORIES WITHOUT SERIOUS COMORBIDITY WITH BODY MASS INDEX (BMI) OF 34.0 TO 34.9 IN ADULT: ICD-10-CM

## 2024-10-30 DIAGNOSIS — I47.10 SVT (SUPRAVENTRICULAR TACHYCARDIA) (H): ICD-10-CM

## 2024-10-30 DIAGNOSIS — I42.9 SECONDARY CARDIOMYOPATHY (H): Primary | ICD-10-CM

## 2024-10-30 PROCEDURE — G2211 COMPLEX E/M VISIT ADD ON: HCPCS | Performed by: INTERNAL MEDICINE

## 2024-10-30 PROCEDURE — 99215 OFFICE O/P EST HI 40 MIN: CPT | Performed by: INTERNAL MEDICINE

## 2024-10-30 NOTE — LETTER
10/30/2024    Physician No Ref-Primary  No address on file    RE: Brent ROY Vinod       Dear Colleague,     I had the pleasure of seeing Brent Brock in the [facility].  HPI and Plan:   Brent is a very nice 39-year-old gentleman with past medical history significant for SVT, PVCs, nonischemic cardiomyopathy, hypertension, obesity and history of steady EtOH use    He states he has been told he has had high blood pressure on his office visits but was never started on any medication until recently.  He was seen in the ER in April for SVT which broke with vagal maneuver.  Workup at that time included an echocardiogram which demonstrated ejection fraction of 30 to 35%.  Subsequent CT angiogram demonstrated normal coronaries.    He was again seen in the ER in September for palpitations and was noted to have frequent PVCs.  A 7-day Zio patch demonstrated average heart rate of 79 ranging from 45-1 41.  He had 1 run of SVT lasting 8 beats at 135 bpm otherwise had isolated PVCs less than 1% of the time.    Up to September he was drinking 6 beers a day.  It was recommended he stop all alcohol consumption and was started on lisinopril 10 mg daily,    Family history is significant for both parents being alive and well in their mid to late 60s.  There is no coronary history in the family.  He quit smoking about a year and a half ago prior to that 1 pack/day.  As stated he was drinking 6 beers per day having stopped September 11.  He does not have diabetes mellitus.  I cannot find any fasting lipid profile in his records.    He has no regular exercise regiment but states he can perform all activities of daily living without any limitations or problems.  He notes his palpitations have all but vanished.  He has an occasional skip or flip-flop at nighttime.  He does note after he takes his lisinopril an hour or 2 later he starts feeling lightheaded dizzy and feels poorly from about 1 PM to 8:30 PM.  He  does not check his blood pressure at home    He does snore quite a bit but this is improved significantly with cutting out alcohol as well    Review of records shows EKG from emergency room sinus tachycardia with PVCs.  Lab was normal including BMP, troponin, N-terminal proBNP, TSH, magnesium, CBC.    Assessment and plan.    He has no symptoms to suggest ischemia and this supported by CT angiogram from earlier this year.    Heart failure is very well compensated.  He has an echo scheduled for next week to follow-up on his cardiomyopathy.  I suspect his cardiomyopathy may be due to alcohol and may be significantly improved.  If it is not I will consider an MRI.    He is having quite a bit of side effects with his lisinopril.  It is also possible that his blood pressure was driven primarily by his alcohol and he does not have significant hypertension.  Blood pressure in the office today is 130/88 and he did not take his pills today.  I have recommended he stop his lisinopril and check his blood pressure over the next week and call with his numbers.  We talked about nonmedicinal things he can do for his blood pressure including low-salt diet, diet high in fresh fruits and vegetables,  regular exercise and weight loss.    Clinically appears that he may have obstructive sleep apnea which could be contributing to his blood pressure and arrhythmias.  He thinks this is substantially improved with stopping alcohol.  I will not proceed with a sleep study at this time but will consider it down the road.    We talked about the benign nature of his palpitations just isolated  PVCs.  He is already dramatically better with stopping alcohol.    We talked about his SVT which is also dramatically gotten better.    As he is never had a fasting lipid profile when he comes in for his echo we will check a fasting lipid profile    I will have him follow-up with an LOLI after all these are done for appropriate further therapy.    I told him  it is possible much of this if not all of this can be treated with lifestyle.  He has stopped cigarettes and alcohol.  I have encouraged regular exercise to include both aerobic and resistance activity.  I have encouraged a predominately plant-based diet.  I have encouraged weight loss.    Further evaluation treatment depend upon above results.  Thank you for allow me to participate in this patient's care.  Sincerely,                               Rip Ramirez MD [facility]      The longitudinal plan of care for the diagnosis(es)/condition(s) as documented were addressed during this visit. Due to the added complexity in care, I will continue to support Brent in the subsequent management and with ongoing continuity of care.      Today's clinic visit entailed:  Review of external notes as documented elsewhere in note  Review of the result(s) of each unique test - EKG, echocardiogram, lab work, ER records  The following tests were independently interpreted by me as noted in my documentation: EKG  Ordering of each unique test  Prescription drug management  45 minutes spent by me on the date of the encounter doing chart review, history and exam, documentation and further activities per the note  Provider  Link to [facility] Help Grid     The level of medical decision making during this visit was of moderate complexity.      Orders Placed This Encounter   Procedures     Lipid Profile     ALT     Follow-Up with Cardiology LOLI       No orders of the defined types were placed in this encounter.      Medications Discontinued During This Encounter   Medication Reason     lisinopril (ZESTRIL) 10 MG tablet          Encounter Diagnoses   Name Primary?     Palpitations      Frequent PVCs      Secondary cardiomyopathy (H) Yes     SVT (supraventricular tachycardia) (H)      Class 1 obesity due to excess calories without serious comorbidity with body mass index (BMI) of 34.0 to 34.9 in adult        CURRENT MEDICATIONS:  No current outpatient  "medications on file.       ALLERGIES     Allergies   Allergen Reactions     No Known Drug Allergy        PAST MEDICAL HISTORY:  Past Medical History:   Diagnosis Date     ADHD (attention deficit hyperactivity disorder)      Diverticulitis of colon      Paroxysmal supraventricular tachycardia        PAST SURGICAL HISTORY:  Past Surgical History:   Procedure Laterality Date     HC BIOPSY OF EYELID      Benign growth     SIGMOIDECTOMY         FAMILY HISTORY:  Family History   Problem Relation Age of Onset     Hypertension Mother      Depression Sister      Diabetes Mother         borderline       SOCIAL HISTORY:  Social History     Socioeconomic History     Marital status:      Spouse name: None     Number of children: None     Years of education: None     Highest education level: None   Tobacco Use     Smoking status: Former     Types: Cigarettes     Smokeless tobacco: Current     Types: Chew     Tobacco comments:     Quit smoking 1.5 years ago   Substance and Sexual Activity     Alcohol use: Not Currently     Comment: not for past 1.5 months     Drug use: No     Sexual activity: Yes     Partners: Female     Birth control/protection: Condom       Review of Systems:  Skin:          Eyes:         ENT:         Respiratory:          Cardiovascular:         Gastroenterology:        Genitourinary:         Musculoskeletal:         Neurologic:         Psychiatric:         Heme/Lymph/Imm:         Endocrine:           Physical Exam:  Vitals: /88 (BP Location: Right arm, Patient Position: Sitting, Cuff Size: Adult Regular)   Pulse 80   Ht 1.753 m (5' 9\")   Wt 104.7 kg (230 lb 12.8 oz)   SpO2 98%   BMI 34.08 kg/m      Constitutional:  cooperative, alert and oriented, well developed, well nourished, in no acute distress obese      Skin:  warm and dry to the touch, no apparent skin lesions or masses noted          Head:  normocephalic, no masses or lesions        Eyes:  pupils equal and round, conjunctivae and " lids unremarkable, sclera white, no xanthalasma, EOMS intact, no nystagmus        Lymph:      ENT:  no pallor or cyanosis, dentition good        Neck:  no carotid bruit        Respiratory:  normal breath sounds, clear to auscultation, normal A-P diameter, normal symmetry, normal respiratory excursion, no use of accessory muscles         Cardiac: regular rhythm;no murmurs, gallops or rubs detected                pulses full and equal                                        GI:           Extremities and Muscular Skeletal:  no edema;no spinal abnormalities noted;normal muscle strength and tone              Neurological:  no gross motor deficits        Psych:  affect appropriate, oriented to time, person and place        CC  Bishop Muniz MD  [address], [address]  [place] [address]                  Thank you for allowing me to participate in the care of your patient.      Sincerely,     Rip Ramirez MD     [facility]  cc:   Bishop Muniz MD  [address]  [place] [address]

## 2024-10-30 NOTE — PROGRESS NOTES
HPI and Plan:   Brent is a very nice 39-year-old gentleman with past medical history significant for SVT, PVCs, nonischemic cardiomyopathy, hypertension, obesity and history of steady EtOH use    He states he has been told he has had high blood pressure on his office visits but was never started on any medication until recently.  He was seen in the ER in April for SVT which broke with vagal maneuver.  Workup at that time included an echocardiogram which demonstrated ejection fraction of 30 to 35%.  Subsequent CT angiogram demonstrated normal coronaries.    He was again seen in the ER in September for palpitations and was noted to have frequent PVCs.  A 7-day Zio patch demonstrated average heart rate of 79 ranging from 45-1 41.  He had 1 run of SVT lasting 8 beats at 135 bpm otherwise had isolated PVCs less than 1% of the time.    Up to September he was drinking 6 beers a day.  It was recommended he stop all alcohol consumption and was started on lisinopril 10 mg daily,    Family history is significant for both parents being alive and well in their mid to late 60s.  There is no coronary history in the family.  He quit smoking about a year and a half ago prior to that 1 pack/day.  As stated he was drinking 6 beers per day having stopped September 11.  He does not have diabetes mellitus.  I cannot find any fasting lipid profile in his records.    He has no regular exercise regiment but states he can perform all activities of daily living without any limitations or problems.  He notes his palpitations have all but vanished.  He has an occasional skip or flip-flop at nighttime.  He does note after he takes his lisinopril an hour or 2 later he starts feeling lightheaded dizzy and feels poorly from about 1 PM to 8:30 PM.  He does not check his blood pressure at home    He does snore quite a bit but this is improved significantly with cutting out alcohol as well    Review of records shows EKG from emergency room sinus  tachycardia with PVCs.  Lab was normal including BMP, troponin, N-terminal proBNP, TSH, magnesium, CBC.    Assessment and plan.    He has no symptoms to suggest ischemia and this supported by CT angiogram from earlier this year.    Heart failure is very well compensated.  He has an echo scheduled for next week to follow-up on his cardiomyopathy.  I suspect his cardiomyopathy may be due to alcohol and may be significantly improved.  If it is not I will consider an MRI.    He is having quite a bit of side effects with his lisinopril.  It is also possible that his blood pressure was driven primarily by his alcohol and he does not have significant hypertension.  Blood pressure in the office today is 130/88 and he did not take his pills today.  I have recommended he stop his lisinopril and check his blood pressure over the next week and call with his numbers.  We talked about nonmedicinal things he can do for his blood pressure including low-salt diet, diet high in fresh fruits and vegetables,  regular exercise and weight loss.    Clinically appears that he may have obstructive sleep apnea which could be contributing to his blood pressure and arrhythmias.  He thinks this is substantially improved with stopping alcohol.  I will not proceed with a sleep study at this time but will consider it down the road.    We talked about the benign nature of his palpitations just isolated  PVCs.  He is already dramatically better with stopping alcohol.    We talked about his SVT which is also dramatically gotten better.    As he is never had a fasting lipid profile when he comes in for his echo we will check a fasting lipid profile    I will have him follow-up with an LOLI after all these are done for appropriate further therapy.    I told him it is possible much of this if not all of this can be treated with lifestyle.  He has stopped cigarettes and alcohol.  I have encouraged regular exercise to include both aerobic and resistance  activity.  I have encouraged a predominately plant-based diet.  I have encouraged weight loss.    Further evaluation treatment depend upon above results.  Thank you for allow me to participate in this patient's care.  Sincerely,                               Rip Ramirez MD [facility]      The longitudinal plan of care for the diagnosis(es)/condition(s) as documented were addressed during this visit. Due to the added complexity in care, I will continue to support Brent in the subsequent management and with ongoing continuity of care.      Today's clinic visit entailed:  Review of external notes as documented elsewhere in note  Review of the result(s) of each unique test - EKG, echocardiogram, lab work, ER records  The following tests were independently interpreted by me as noted in my documentation: EKG  Ordering of each unique test  Prescription drug management  45 minutes spent by me on the date of the encounter doing chart review, history and exam, documentation and further activities per the note  Provider  Link to [facility] Help Grid     The level of medical decision making during this visit was of moderate complexity.      Orders Placed This Encounter   Procedures    Lipid Profile    ALT    Follow-Up with Cardiology LOLI       No orders of the defined types were placed in this encounter.      Medications Discontinued During This Encounter   Medication Reason    lisinopril (ZESTRIL) 10 MG tablet          Encounter Diagnoses   Name Primary?    Palpitations     Frequent PVCs     Secondary cardiomyopathy (H) Yes    SVT (supraventricular tachycardia) (H)     Class 1 obesity due to excess calories without serious comorbidity with body mass index (BMI) of 34.0 to 34.9 in adult        CURRENT MEDICATIONS:  No current outpatient medications on file.       ALLERGIES     Allergies   Allergen Reactions    No Known Drug Allergy        PAST MEDICAL HISTORY:  Past Medical History:   Diagnosis Date    ADHD (attention deficit  "hyperactivity disorder)     Diverticulitis of colon     Paroxysmal supraventricular tachycardia        PAST SURGICAL HISTORY:  Past Surgical History:   Procedure Laterality Date    HC BIOPSY OF EYELID      Benign growth    SIGMOIDECTOMY         FAMILY HISTORY:  Family History   Problem Relation Age of Onset    Hypertension Mother     Depression Sister     Diabetes Mother         borderline       SOCIAL HISTORY:  Social History     Socioeconomic History    Marital status:      Spouse name: None    Number of children: None    Years of education: None    Highest education level: None   Tobacco Use    Smoking status: Former     Types: Cigarettes    Smokeless tobacco: Current     Types: Chew    Tobacco comments:     Quit smoking 1.5 years ago   Substance and Sexual Activity    Alcohol use: Not Currently     Comment: not for past 1.5 months    Drug use: No    Sexual activity: Yes     Partners: Female     Birth control/protection: Condom       Review of Systems:  Skin:          Eyes:         ENT:         Respiratory:          Cardiovascular:         Gastroenterology:        Genitourinary:         Musculoskeletal:         Neurologic:         Psychiatric:         Heme/Lymph/Imm:         Endocrine:           Physical Exam:  Vitals: /88 (BP Location: Right arm, Patient Position: Sitting, Cuff Size: Adult Regular)   Pulse 80   Ht 1.753 m (5' 9\")   Wt 104.7 kg (230 lb 12.8 oz)   SpO2 98%   BMI 34.08 kg/m      Constitutional:  cooperative, alert and oriented, well developed, well nourished, in no acute distress obese      Skin:  warm and dry to the touch, no apparent skin lesions or masses noted          Head:  normocephalic, no masses or lesions        Eyes:  pupils equal and round, conjunctivae and lids unremarkable, sclera white, no xanthalasma, EOMS intact, no nystagmus        Lymph:      ENT:  no pallor or cyanosis, dentition good        Neck:  no carotid bruit        Respiratory:  normal breath sounds, " clear to auscultation, normal A-P diameter, normal symmetry, normal respiratory excursion, no use of accessory muscles         Cardiac: regular rhythm;no murmurs, gallops or rubs detected                pulses full and equal                                        GI:           Extremities and Muscular Skeletal:  no edema;no spinal abnormalities noted;normal muscle strength and tone              Neurological:  no gross motor deficits        Psych:  affect appropriate, oriented to time, person and place        CC  Bishop Muniz MD  [address] , [address]  [place] [address]

## 2024-11-08 ENCOUNTER — HOSPITAL ENCOUNTER (OUTPATIENT)
Dept: CARDIOLOGY | Facility: CLINIC | Age: 39
Discharge: HOME | End: 2024-11-08
Attending: INTERNAL MEDICINE | Admitting: INTERNAL MEDICINE
Payer: COMMERCIAL

## 2024-11-08 DIAGNOSIS — I42.9 SECONDARY CARDIOMYOPATHY (H): ICD-10-CM

## 2024-11-08 LAB — LVEF ECHO: NORMAL

## 2024-11-08 PROCEDURE — 999N000208 ECHOCARDIOGRAM COMPLETE

## 2024-11-08 PROCEDURE — 93306 TTE W/DOPPLER COMPLETE: CPT | Mod: 26 | Performed by: INTERNAL MEDICINE

## 2024-11-08 PROCEDURE — 255N000002 HC RX 255 OP 636: Performed by: INTERNAL MEDICINE

## 2024-11-08 RX ADMIN — HUMAN ALBUMIN MICROSPHERES AND PERFLUTREN 3 ML: 10; .22 INJECTION, SOLUTION INTRAVENOUS at 15:03

## 2024-11-11 ENCOUNTER — LAB (OUTPATIENT)
Dept: LAB | Facility: CLINIC | Age: 39
End: 2024-11-11
Payer: COMMERCIAL

## 2024-11-11 DIAGNOSIS — I42.9 SECONDARY CARDIOMYOPATHY (H): ICD-10-CM

## 2024-11-11 LAB
ALT SERPL W P-5'-P-CCNC: 49 U/L (ref 0–70)
CHOLEST SERPL-MCNC: 179 MG/DL
FASTING STATUS PATIENT QL REPORTED: YES
HDLC SERPL-MCNC: 32 MG/DL
LDLC SERPL CALC-MCNC: 121 MG/DL
NONHDLC SERPL-MCNC: 147 MG/DL
TRIGL SERPL-MCNC: 130 MG/DL

## 2024-11-11 PROCEDURE — 36415 COLL VENOUS BLD VENIPUNCTURE: CPT | Performed by: INTERNAL MEDICINE

## 2024-11-11 PROCEDURE — 80061 LIPID PANEL: CPT | Performed by: INTERNAL MEDICINE

## 2024-11-11 PROCEDURE — 84460 ALANINE AMINO (ALT) (SGPT): CPT | Performed by: INTERNAL MEDICINE

## 2024-11-18 ENCOUNTER — OFFICE VISIT (OUTPATIENT)
Dept: CARDIOLOGY | Facility: CLINIC | Age: 39
End: 2024-11-18
Attending: INTERNAL MEDICINE
Payer: COMMERCIAL

## 2024-11-18 VITALS
OXYGEN SATURATION: 97 % | HEIGHT: 69 IN | SYSTOLIC BLOOD PRESSURE: 136 MMHG | DIASTOLIC BLOOD PRESSURE: 84 MMHG | BODY MASS INDEX: 33.92 KG/M2 | WEIGHT: 229 LBS | HEART RATE: 97 BPM

## 2024-11-18 DIAGNOSIS — I42.9 SECONDARY CARDIOMYOPATHY (H): ICD-10-CM

## 2024-11-18 DIAGNOSIS — R00.2 PALPITATIONS: ICD-10-CM

## 2024-11-18 DIAGNOSIS — I49.3 FREQUENT PVCS: ICD-10-CM

## 2024-11-18 DIAGNOSIS — I42.8 NONISCHEMIC CARDIOMYOPATHY (H): Primary | ICD-10-CM

## 2024-11-18 RX ORDER — LOSARTAN POTASSIUM 25 MG/1
25 TABLET ORAL DAILY
Qty: 90 TABLET | Refills: 3 | Status: SHIPPED | OUTPATIENT
Start: 2024-11-18

## 2024-11-18 RX ORDER — METOPROLOL SUCCINATE 25 MG/1
25 TABLET, EXTENDED RELEASE ORAL DAILY
Qty: 90 TABLET | Refills: 3 | Status: SHIPPED | OUTPATIENT
Start: 2024-11-18

## 2024-11-18 NOTE — PATIENT INSTRUCTIONS
Call the nurse for any questions or concerns at [phone].     Plan:  1. Medication changes:     Start metoprolol XL 25 mg once daily.    Start losartan 25 mg once daily.     2. Cardiac MRI in the near future    3. Follow up after testing   -Scheduling phone number: [phone]    It was great seeing you today!    Elena Frye PA-C  Physician Assistant  [facility]       Component      Latest Ref Rng 10/23/2024  11:16 AM 11/11/2024  10:42 AM   Sodium      135 - 145 mmol/L 140     Potassium      3.4 - 5.3 mmol/L 4.1     Chloride      98 - 107 mmol/L 107     Carbon Dioxide (CO2)      22 - 29 mmol/L 23     Anion Gap      7 - 15 mmol/L 10     Urea Nitrogen      6.0 - 20.0 mg/dL 13.2     Creatinine      0.67 - 1.17 mg/dL 0.80     GFR Estimate      >60 mL/min/1.73m2 >90     Calcium      8.8 - 10.4 mg/dL 9.5     Glucose      70 - 99 mg/dL 116 (H)     Cholesterol      <200 mg/dL  179    Triglycerides      <150 mg/dL  130    HDL Cholesterol      >=40 mg/dL  32 (L)    LDL Cholesterol Calculated      <100 mg/dL  121 (H)    Non HDL Cholesterol      <130 mg/dL  147 (H)    Patient Fasting?  Yes    ALT      0 - 70 U/L  49       Legend:  (H) High  (L) Low

## 2024-11-18 NOTE — PROGRESS NOTES
Cardiology Clinic Progress Note  Brent Brock MRN# [id number]   YOB: 1985 Age: 39 year old   Primary Cardiologist: Dr. Ramirez Reason for visit: follow-up             Assessment and Plan:   Brent Brock is a very pleasant 39 year old male who is here today for follow-up.     HFrEF and nonischemic cardiomyopathy  - LVEF: 30-35% on echo 5/24/2023, now 40-45% on echo 11/2024  - NYHA class I, stage C  - Etiology: undetermined, needs cMRI  - Fluid status: euvolemic   - Diuretic regimen: none  - Ischemic evaluation: CT coronary angiogram 7/2023 with normal coronary arteries and no detectable stenoses or plaque  - Ziopatch monitor 10/2024 without significant arrhythmia   - Guideline directed medical therapy:  - Beta blocker: Start Toprol-XL 25 mg once daily  - ACEI/ARB/ARNI: Start losartan 25 mg once daily  - Aldactone antagonist: none yet  - SGLT2 inhibitor: none yet  -Counseled patient on fluid and sodium restriction  2.  Symptomatic the low burden PVCs, noted during ED visit 9/2024 and on follow-up Zio patch monitor (ordered less than 1%).  3.  SVT with the ED presentation 4/2023 that terminated with Valsalva. No subjective recurrence.   4.  Hypertension, uncontrolled.  Start lisinopril 10 mg once daily.  5.  Hx of alcohol abuse, with complete cessation 2 months ago. Prior to that was consuming a minimum of 6 beers nightly for quite some time. Congratulated patient and encouraged ongoing abstinence.  6.  Tobacco use disorder, cessation advised.  Used to smoke, quit 1.5 years ago.  Now chews tobacco.    Changes today:   Start Toprol-XL 25 mg once daily  Start losartan 25 mg once daily    Recommend cardiac MRI for further evaluation.    Follow-up after testing.     Elena Fyre PA-C  [facility]  Pager: [phone]          History of Presenting Illness:    Brent Brock is a very pleasant 39 year old male with a history of diverticulitis, paroxysmal SVT, and  nonischemic cardiomyopathy.     Patient established care with EP, Dr. Dinh, 5/2023 for evaluation of SVT.  In April of that year, patient had presented to the ED for evaluation of palpitations.  ECG confirmed SVT which terminated with Valsalva maneuvers.  Options for management were discussed including watch and wait, pharmacotherapy, or catheter ablation.  Patient preferred to think about his options prior to proceeding.  An echocardiogram was completed 5/24/2023 that revealed mildly dilated LV, LVEF 30 to 35% with severe global hypokinesia.  RV was normal size with mildly decreased RV systolic function.  No hemodynamically significant valvular disease.  Zio patch monitor was recommended for rhythm evaluation as well as CTA to rule out CAD.  Zio patch monitor revealed rare PACs/PVCs.  CT coronary angiogram 7/25/2023 revealed normal coronary anatomy with no detectable stenosis or plaque. It appears he was then lost to follow up.      Patient was in the ED 9/15/2024 with palpitations. ECG there revealed ST with frequent PVCs. He was discharged the same day and advised to follow-up with cardiology.      Follow-up 7-day Zio patch monitor was completed 9/2024 and revealed normal sinus rhythm with an average heart rate of 79 bpm.  There was 1 episode of SVT lasting 8 beats in duration at a rate of 130 bpm.  Symptomatic triggers associated with rare PVCs (less than 1%).    I met patient for the first time 12/21/2024. At that time, he reported complete alcohol cessation x 1 month. His blood pressure was elevated, so I started lisinopril. He met Dr. Ramirez a few weeks later who recommended discontinuation of lisinopril as his blood pressure was 130/88 in clinic and patient reported various side effects.     Echocardiogram 11/8/2024 revealed ongoing LV dysfunction with LVEF 40-45%, normal RV size and function, and no hemodynamically significant valvular disease.    Patient is here today for follow-up.  He reports feeling  "well from a cardiac perspective.  Denies chest pain, lightheadedness, dizziness, near-syncope or syncope.  No shortness of breath, orthopnea or PND.  He continues to experience intermittent episodes of palpitations, nothing sustained or prolonged.     He is not taking any medications. He did not tolerate lisinopril 10 mg daily - developed brain fog, dizziness, and lethargy. These sx resolved after stopping the medication.     Blood pressure 136/84 and HR 97 in clinic today.     Continues to abstain from alcohol. Continues to chew tobacco.         Social History       Social History     Socioeconomic History    Marital status:      Spouse name: Not on file    Number of children: Not on file    Years of education: Not on file    Highest education level: Not on file   Occupational History    Not on file   Tobacco Use    Smoking status: Former     Types: Cigarettes    Smokeless tobacco: Current     Types: Chew    Tobacco comments:     Quit smoking 1.5 years ago   Substance and Sexual Activity    Alcohol use: Not Currently     Comment: not for past 1.5 months    Drug use: No    Sexual activity: Yes     Partners: Female     Birth control/protection: Condom   Other Topics Concern    Not on file   Social History Narrative    Not on file     Social Drivers of Health     Financial Resource Strain: Not on file   Food Insecurity: Not on file   Transportation Needs: Not on file   Physical Activity: Not on file   Stress: Not on file   Social Connections: Not on file   Interpersonal Safety: Not on file   Housing Stability: Not on file            Review of Systems:   Please see HPI         Physical Exam:   Vitals: /84 (BP Location: Right arm, Patient Position: Sitting, Cuff Size: Adult Large)   Pulse 97   Ht 1.753 m (5' 9\")   Wt 103.9 kg (229 lb)   SpO2 97%   BMI 33.82 kg/m     Wt Readings from Last 4 Encounters:   10/30/24 104.7 kg (230 lb 12.8 oz)   10/21/24 103.4 kg (228 lb)   09/15/24 101.9 kg (224 lb 10.4 oz) " "  05/03/23 97.1 kg (214 lb)     GEN: well nourished, in no acute distress.  HEENT:  Pupils equal, round. Sclerae nonicteric.   NECK: Supple, no masses appreciated. No JVD  C/V:  Regular rate and rhythm, no murmur, rub or gallop.    RESP: Respirations are unlabored. Clear to auscultation bilaterally without wheezing, rales, or rhonchi.  GI: Abdomen soft, nontender.  EXTREM: no LE edema.  NEURO: Alert and oriented, cooperative.  SKIN: Warm and dry.        Data:   LIPID RESULTS:  Lab Results   Component Value Date    CHOL 179 11/11/2024    HDL 32 (L) 11/11/2024     (H) 11/11/2024    TRIG 130 11/11/2024     LIVER ENZYME RESULTS:  Lab Results   Component Value Date    ALT 49 11/11/2024     CBC RESULTS:  Lab Results   Component Value Date    WBC 6.8 09/15/2024    RBC 4.98 09/15/2024    HGB 14.5 09/15/2024    HCT 42.7 09/15/2024    MCV 86 09/15/2024    MCH 29.1 09/15/2024    MCHC 34.0 09/15/2024    RDW 11.9 09/15/2024     09/15/2024     BMP RESULTS:  Lab Results   Component Value Date     10/23/2024    POTASSIUM 4.1 10/23/2024    POTASSIUM 3.6 02/15/2022    POTASSIUM 4.2 01/20/2014    CHLORIDE 107 10/23/2024    CHLORIDE 107 02/14/2022    CO2 23 10/23/2024    CO2 28 02/14/2022    ANIONGAP 10 10/23/2024    ANIONGAP 4 02/14/2022     (H) 10/23/2024    GLC 88 02/14/2022    GLC 99 01/20/2014    BUN 13.2 10/23/2024    BUN 12 02/14/2022    CR 0.80 10/23/2024    GFRESTIMATED >90 10/23/2024    GFRESTIMATED >60 01/20/2014    GFRESTBLACK >60 01/20/2014    AILYN 9.5 10/23/2024      A1C RESULTS:  No results found for: \"A1C\"  INR RESULTS:  No results found for: \"INR\"         Medications     No current outpatient medications on file.          Past Medical History     Past Medical History:   Diagnosis Date    ADHD (attention deficit hyperactivity disorder)     Diverticulitis of colon     Paroxysmal supraventricular tachycardia      Past Surgical History:   Procedure Laterality Date    HC BIOPSY OF EYELID      " Benign growth    SIGMOIDECTOMY       Family History   Problem Relation Age of Onset    Hypertension Mother     Depression Sister     Diabetes Mother         borderline            Allergies   No known drug allergy    The longitudinal plan of care for the diagnosis(es)/condition(s) as documented were addressed during this visit. Due to the added complexity in care, I will continue to support Brent in the subsequent management and with ongoing continuity of care.     30 minutes spent on the date of the encounter doing chart review, history and exam, documentation and further activities as noted above    Elena Frye PA-C  [facility]  Pager: [phone]

## 2024-11-18 NOTE — LETTER
11/18/2024    Physician No Ref-Primary  No address on file    RE: Brent Brock       Dear Colleague,     I had the pleasure of seeing Brent Brock in the [facility].  Cardiology Clinic Progress Note  Brent Brock MRN# [id number]   YOB: 1985 Age: 39 year old   Primary Cardiologist: Dr. Ramirez Reason for visit: follow-up             Assessment and Plan:   Brent Brock is a very pleasant 39 year old male who is here today for follow-up.     HFrEF and nonischemic cardiomyopathy  - LVEF: 30-35% on echo 5/24/2023, now 40-45% on echo 11/2024  - NYHA class I, stage C  - Etiology: undetermined, needs cMRI  - Fluid status: euvolemic   - Diuretic regimen: none  - Ischemic evaluation: CT coronary angiogram 7/2023 with normal coronary arteries and no detectable stenoses or plaque  - Ziopatch monitor 10/2024 without significant arrhythmia   - Guideline directed medical therapy:  - Beta blocker: Start Toprol-XL 25 mg once daily  - ACEI/ARB/ARNI: Start losartan 25 mg once daily  - Aldactone antagonist: none yet  - SGLT2 inhibitor: none yet  -Counseled patient on fluid and sodium restriction  2.  Symptomatic the low burden PVCs, noted during ED visit 9/2024 and on follow-up Zio patch monitor (ordered less than 1%).  3.  SVT with the ED presentation 4/2023 that terminated with Valsalva. No subjective recurrence.   4.  Hypertension, uncontrolled.  Start lisinopril 10 mg once daily.  5.  Hx of alcohol abuse, with complete cessation 2 months ago. Prior to that was consuming a minimum of 6 beers nightly for quite some time. Congratulated patient and encouraged ongoing abstinence.  6.  Tobacco use disorder, cessation advised.  Used to smoke, quit 1.5 years ago.  Now chews tobacco.    Changes today:   Start Toprol-XL 25 mg once daily  Start losartan 25 mg once daily    Recommend cardiac MRI for further evaluation.    Follow-up after testing.     JD Walker  [facility]  Pager: [phone]          History of Presenting Illness:    Brent Brock is a very pleasant 39 year old male with a history of diverticulitis, paroxysmal SVT, and nonischemic cardiomyopathy.     Patient established care with EP, Dr. Dinh, 5/2023 for evaluation of SVT.  In April of that year, patient had presented to the ED for evaluation of palpitations.  ECG confirmed SVT which terminated with Valsalva maneuvers.  Options for management were discussed including watch and wait, pharmacotherapy, or catheter ablation.  Patient preferred to think about his options prior to proceeding.  An echocardiogram was completed 5/24/2023 that revealed mildly dilated LV, LVEF 30 to 35% with severe global hypokinesia.  RV was normal size with mildly decreased RV systolic function.  No hemodynamically significant valvular disease.  Zio patch monitor was recommended for rhythm evaluation as well as CTA to rule out CAD.  Zio patch monitor revealed rare PACs/PVCs.  CT coronary angiogram 7/25/2023 revealed normal coronary anatomy with no detectable stenosis or plaque. It appears he was then lost to follow up.      Patient was in the ED 9/15/2024 with palpitations. ECG there revealed ST with frequent PVCs. He was discharged the same day and advised to follow-up with cardiology.      Follow-up 7-day Zio patch monitor was completed 9/2024 and revealed normal sinus rhythm with an average heart rate of 79 bpm.  There was 1 episode of SVT lasting 8 beats in duration at a rate of 130 bpm.  Symptomatic triggers associated with rare PVCs (less than 1%).    I met patient for the first time 12/21/2024. At that time, he reported complete alcohol cessation x 1 month. His blood pressure was elevated, so I started lisinopril. He met Dr. Ramirez a few weeks later who recommended discontinuation of lisinopril as his blood pressure was 130/88 in clinic and patient reported various side effects.      Echocardiogram 11/8/2024 revealed ongoing LV dysfunction with LVEF 40-45%, normal RV size and function, and no hemodynamically significant valvular disease.    Patient is here today for follow-up.  He reports feeling well from a cardiac perspective.  Denies chest pain, lightheadedness, dizziness, near-syncope or syncope.  No shortness of breath, orthopnea or PND.  He continues to experience intermittent episodes of palpitations, nothing sustained or prolonged.     He is not taking any medications. He did not tolerate lisinopril 10 mg daily - developed brain fog, dizziness, and lethargy. These sx resolved after stopping the medication.     Blood pressure 136/84 and HR 97 in clinic today.     Continues to abstain from alcohol. Continues to chew tobacco.         Social History       Social History     Socioeconomic History     Marital status:      Spouse name: Not on file     Number of children: Not on file     Years of education: Not on file     Highest education level: Not on file   Occupational History     Not on file   Tobacco Use     Smoking status: Former     Types: Cigarettes     Smokeless tobacco: Current     Types: Chew     Tobacco comments:     Quit smoking 1.5 years ago   Substance and Sexual Activity     Alcohol use: Not Currently     Comment: not for past 1.5 months     Drug use: No     Sexual activity: Yes     Partners: Female     Birth control/protection: Condom   Other Topics Concern     Not on file   Social History Narrative     Not on file     Social Drivers of Health     Financial Resource Strain: Not on file   Food Insecurity: Not on file   Transportation Needs: Not on file   Physical Activity: Not on file   Stress: Not on file   Social Connections: Not on file   Interpersonal Safety: Not on file   Housing Stability: Not on file            Review of Systems:   Please see HPI         Physical Exam:   Vitals: /84 (BP Location: Right arm, Patient Position: Sitting, Cuff Size: Adult  "Large)   Pulse 97   Ht 1.753 m (5' 9\")   Wt 103.9 kg (229 lb)   SpO2 97%   BMI 33.82 kg/m     Wt Readings from Last 4 Encounters:   10/30/24 104.7 kg (230 lb 12.8 oz)   10/21/24 103.4 kg (228 lb)   09/15/24 101.9 kg (224 lb 10.4 oz)   05/03/23 97.1 kg (214 lb)     GEN: well nourished, in no acute distress.  HEENT:  Pupils equal, round. Sclerae nonicteric.   NECK: Supple, no masses appreciated. No JVD  C/V:  Regular rate and rhythm, no murmur, rub or gallop.    RESP: Respirations are unlabored. Clear to auscultation bilaterally without wheezing, rales, or rhonchi.  GI: Abdomen soft, nontender.  EXTREM: no LE edema.  NEURO: Alert and oriented, cooperative.  SKIN: Warm and dry.        Data:   LIPID RESULTS:  Lab Results   Component Value Date    CHOL 179 11/11/2024    HDL 32 (L) 11/11/2024     (H) 11/11/2024    TRIG 130 11/11/2024     LIVER ENZYME RESULTS:  Lab Results   Component Value Date    ALT 49 11/11/2024     CBC RESULTS:  Lab Results   Component Value Date    WBC 6.8 09/15/2024    RBC 4.98 09/15/2024    HGB 14.5 09/15/2024    HCT 42.7 09/15/2024    MCV 86 09/15/2024    MCH 29.1 09/15/2024    MCHC 34.0 09/15/2024    RDW 11.9 09/15/2024     09/15/2024     BMP RESULTS:  Lab Results   Component Value Date     10/23/2024    POTASSIUM 4.1 10/23/2024    POTASSIUM 3.6 02/15/2022    POTASSIUM 4.2 01/20/2014    CHLORIDE 107 10/23/2024    CHLORIDE 107 02/14/2022    CO2 23 10/23/2024    CO2 28 02/14/2022    ANIONGAP 10 10/23/2024    ANIONGAP 4 02/14/2022     (H) 10/23/2024    GLC 88 02/14/2022    GLC 99 01/20/2014    BUN 13.2 10/23/2024    BUN 12 02/14/2022    CR 0.80 10/23/2024    GFRESTIMATED >90 10/23/2024    GFRESTIMATED >60 01/20/2014    GFRESTBLACK >60 01/20/2014    AILYN 9.5 10/23/2024      A1C RESULTS:  No results found for: \"A1C\"  INR RESULTS:  No results found for: \"INR\"         Medications     No current outpatient medications on file.          Past Medical History     Past Medical " History:   Diagnosis Date     ADHD (attention deficit hyperactivity disorder)      Diverticulitis of colon      Paroxysmal supraventricular tachycardia      Past Surgical History:   Procedure Laterality Date     HC BIOPSY OF EYELID      Benign growth     SIGMOIDECTOMY       Family History   Problem Relation Age of Onset     Hypertension Mother      Depression Sister      Diabetes Mother         borderline            Allergies   No known drug allergy    The longitudinal plan of care for the diagnosis(es)/condition(s) as documented were addressed during this visit. Due to the added complexity in care, I will continue to support Brent in the subsequent management and with ongoing continuity of care.     30 minutes spent on the date of the encounter doing chart review, history and exam, documentation and further activities as noted above    JD Walker [facility]  Pager: [phone]      Thank you for allowing me to participate in the care of your patient.      Sincerely,     JD Walker [facility]  cc:   Rip Ramirez MD  [address] [address]

## 2024-12-10 PROCEDURE — 250N000011 HC RX IP 250 OP 636: Performed by: EMERGENCY MEDICINE

## 2024-12-10 PROCEDURE — 99284 EMERGENCY DEPT VISIT MOD MDM: CPT | Mod: 25

## 2024-12-10 RX ORDER — ONDANSETRON 4 MG/1
4 TABLET, ORALLY DISINTEGRATING ORAL ONCE
Status: COMPLETED | OUTPATIENT
Start: 2024-12-10 | End: 2024-12-10

## 2024-12-10 RX ADMIN — ONDANSETRON 4 MG: 4 TABLET, ORALLY DISINTEGRATING ORAL at 22:16

## 2024-12-10 ASSESSMENT — COLUMBIA-SUICIDE SEVERITY RATING SCALE - C-SSRS
2. HAVE YOU ACTUALLY HAD ANY THOUGHTS OF KILLING YOURSELF IN THE PAST MONTH?: NO
1. IN THE PAST MONTH, HAVE YOU WISHED YOU WERE DEAD OR WISHED YOU COULD GO TO SLEEP AND NOT WAKE UP?: NO
6. HAVE YOU EVER DONE ANYTHING, STARTED TO DO ANYTHING, OR PREPARED TO DO ANYTHING TO END YOUR LIFE?: NO

## 2024-12-11 ENCOUNTER — HOSPITAL ENCOUNTER (EMERGENCY)
Facility: CLINIC | Age: 39
Discharge: HOME | End: 2024-12-11
Attending: EMERGENCY MEDICINE | Admitting: EMERGENCY MEDICINE
Payer: COMMERCIAL

## 2024-12-11 VITALS
TEMPERATURE: 98.3 F | BODY MASS INDEX: 32.93 KG/M2 | HEART RATE: 71 BPM | RESPIRATION RATE: 18 BRPM | OXYGEN SATURATION: 96 % | HEIGHT: 70 IN | SYSTOLIC BLOOD PRESSURE: 153 MMHG | WEIGHT: 230 LBS | DIASTOLIC BLOOD PRESSURE: 83 MMHG

## 2024-12-11 DIAGNOSIS — R51.9 NONINTRACTABLE HEADACHE, UNSPECIFIED CHRONICITY PATTERN, UNSPECIFIED HEADACHE TYPE: ICD-10-CM

## 2024-12-11 LAB
FLUAV RNA SPEC QL NAA+PROBE: NEGATIVE
FLUBV RNA RESP QL NAA+PROBE: NEGATIVE
RSV RNA SPEC NAA+PROBE: NEGATIVE
SARS-COV-2 RNA RESP QL NAA+PROBE: NEGATIVE

## 2024-12-11 PROCEDURE — 258N000003 HC RX IP 258 OP 636: Performed by: EMERGENCY MEDICINE

## 2024-12-11 PROCEDURE — 87637 SARSCOV2&INF A&B&RSV AMP PRB: CPT | Performed by: EMERGENCY MEDICINE

## 2024-12-11 PROCEDURE — 96375 TX/PRO/DX INJ NEW DRUG ADDON: CPT

## 2024-12-11 PROCEDURE — 96361 HYDRATE IV INFUSION ADD-ON: CPT

## 2024-12-11 PROCEDURE — 96374 THER/PROPH/DIAG INJ IV PUSH: CPT

## 2024-12-11 PROCEDURE — 250N000011 HC RX IP 250 OP 636: Performed by: EMERGENCY MEDICINE

## 2024-12-11 RX ORDER — DIPHENHYDRAMINE HYDROCHLORIDE 50 MG/ML
25 INJECTION INTRAMUSCULAR; INTRAVENOUS ONCE
Status: COMPLETED | OUTPATIENT
Start: 2024-12-11 | End: 2024-12-11

## 2024-12-11 RX ORDER — DIPHENHYDRAMINE HCL 25 MG
25 CAPSULE ORAL ONCE
Status: DISCONTINUED | OUTPATIENT
Start: 2024-12-11 | End: 2024-12-11

## 2024-12-11 RX ORDER — KETOROLAC TROMETHAMINE 15 MG/ML
15 INJECTION, SOLUTION INTRAMUSCULAR; INTRAVENOUS ONCE
Status: COMPLETED | OUTPATIENT
Start: 2024-12-11 | End: 2024-12-11

## 2024-12-11 RX ADMIN — SODIUM CHLORIDE 1000 ML: 9 INJECTION, SOLUTION INTRAVENOUS at 02:00

## 2024-12-11 RX ADMIN — PROCHLORPERAZINE EDISYLATE 10 MG: 5 INJECTION INTRAMUSCULAR; INTRAVENOUS at 02:00

## 2024-12-11 RX ADMIN — KETOROLAC TROMETHAMINE 15 MG: 15 INJECTION, SOLUTION INTRAMUSCULAR; INTRAVENOUS at 02:00

## 2024-12-11 RX ADMIN — DIPHENHYDRAMINE HYDROCHLORIDE 25 MG: 50 INJECTION, SOLUTION INTRAMUSCULAR; INTRAVENOUS at 02:00

## 2024-12-11 ASSESSMENT — ACTIVITIES OF DAILY LIVING (ADL): ADLS_ACUITY_SCORE: 46

## 2024-12-11 NOTE — ED PROVIDER NOTES
"  Emergency Department Note      History of Present Illness     Chief Complaint   Headache      HPI   Brent Brock is a 39 year old male with a known history of migraines and nonischemic cardiomyopathy who presents with a headache.  Patient reports for the past 2 days having an right sided headache that starts from his right eye.  He reports associated photophobia, lightheadedness and an episode of nonbloody emesis.  He reports trying one of his mothers almotriptan's yesterday though this did not improve much his headache much.  He denies any fever, sore throat, cough, chest pain, dyspnea, focal weakness, paresthesias, blurry vision.  He reports seeing a neurologist in the past though not recently.  He reports this headache feels similar to prior headaches just lasting longer in duration.  No alcohol or drug use.  No reported trauma.    Independent Historian   None    Review of External Notes   11/18/24 cardiology note: nonischemic cardiomyopathy    Past Medical History     Medical History and Problem List   Past Medical History:   Diagnosis Date    ADHD (attention deficit hyperactivity disorder)     Diverticulitis of colon     Paroxysmal supraventricular tachycardia        Medications   losartan (COZAAR) 25 MG tablet  metoprolol succinate ER (TOPROL XL) 25 MG 24 hr tablet        Surgical History   Past Surgical History:   Procedure Laterality Date    HC BIOPSY OF EYELID      Benign growth    SIGMOIDECTOMY         Physical Exam     Patient Vitals for the past 24 hrs:   BP Temp Temp src Pulse Resp SpO2 Height Weight   12/11/24 0255 -- -- -- -- -- 96 % -- --   12/11/24 0250 (!) 153/83 -- -- 71 -- 97 % -- --   12/11/24 0244 -- -- -- -- -- -- 1.778 m (5' 10\") 104.3 kg (230 lb)   12/10/24 2214 -- -- -- -- -- 100 % -- --   12/10/24 2213 (!) 171/96 98.3  F (36.8  C) Oral 85 18 -- -- --     Physical Exam  General: Well-nourished, nontoxic  Eyes: PERRL, EOMI, no nystagmus.  No scleral icterus or conjunctival " injection.    ENT:  Moist mucus membranes, posterior oropharynx clear without erythema or exudates. TM normal bilaterally  Neck: Supple with full range of motion  Respiratory:  Lungs clear to auscultation bilaterally, no crackles/rubs/wheezes.  Good air movement  CV: Normal rate and rhythm  GI:  Abdomen soft and non-distended.  No tenderness, guarding or rebound  Skin: Warm, dry.  No rashes or petechiae  MSK: No peripheral edema or calf tenderness  Neuro: Alert and oriented to person/place/time.  No aphasia/facial droop/dysarthria.  Tongue midline, normal strength at SCM/trapezius/BUE/BLE.  Normal finger to nose. Normal gait.  Sensation intact over face/BUE/BLE  Psychiatric: Normal affect      Diagnostics     Lab Results   Labs Ordered and Resulted from Time of ED Arrival to Time of ED Departure   INFLUENZA A/B, RSV AND SARS-COV2 PCR - Normal       Result Value    Influenza A PCR Negative      Influenza B PCR Negative      RSV PCR Negative      SARS CoV2 PCR Negative         Imaging   No orders to display         Independent Interpretation   None    ED Course      Medications Administered   Medications   ondansetron (ZOFRAN ODT) ODT tab 4 mg (4 mg Oral $Given 12/10/24 2216)   prochlorperazine (COMPAZINE) injection 10 mg (10 mg Intravenous $Given 12/11/24 0200)   ketorolac (TORADOL) injection 15 mg (15 mg Intravenous $Given 12/11/24 0200)   sodium chloride 0.9% BOLUS 1,000 mL (0 mLs Intravenous Stopped 12/11/24 0257)   diphenhydrAMINE (BENADRYL) injection 25 mg (25 mg Intravenous $Given 12/11/24 0200)       Procedures   Procedures     Discussion of Management   None    ED Course        Additional Documentation  None    Medical Decision Making / Diagnosis     CMS Diagnoses: None    MIPS       None    MDM   Brent Brock is a 39 year old male presented with a headache consistent with previous headaches.  Evaluation in the emergency department has been negative. The patient has not had any fever or neck stiffness  so I doubt meningitis.  The headache was gradual in onset and like previous headaches so I doubt SAH.  There is no associated numbness, paresthesia or confusion and I doubt stroke or CNS tumor. I do not feel that advanced imaging is indicated at this time. The patient was treated symptomatically and pain has improved with medication interventions.  The patient should follow-up with the primary physician within 3 days. I have also placed a formal neurology referral.  Counseled to avoid medications that are not prescribed to him.  Return if increasing pain, fever, vomiting or weakness.        Disposition   The patient was discharged.     Diagnosis     ICD-10-CM    1. Nonintractable headache, unspecified chronicity pattern, unspecified headache type  R51.9 Adult Neurology  Referral           Discharge Medications   Discharge Medication List as of 12/11/2024  2:57 AM            DO Bozena Elias Lindsey E, DO  12/11/24 0333

## 2024-12-11 NOTE — ED TRIAGE NOTES
Pt here for migraines for the past few days. Pt endorses light sensitivity and nausea. Last tylenol around 1830. Hx of migraines

## 2025-06-14 ENCOUNTER — HEALTH MAINTENANCE LETTER (OUTPATIENT)
Age: 40
End: 2025-06-14

## (undated) RX ORDER — METOPROLOL TARTRATE 50 MG
TABLET ORAL
Status: DISPENSED
Start: 2023-07-25

## (undated) RX ORDER — METOPROLOL TARTRATE 1 MG/ML
INJECTION, SOLUTION INTRAVENOUS
Status: DISPENSED
Start: 2023-07-25

## (undated) RX ORDER — IVABRADINE 5 MG/1
TABLET, FILM COATED ORAL
Status: DISPENSED
Start: 2023-07-25